# Patient Record
Sex: MALE | Race: WHITE | NOT HISPANIC OR LATINO | URBAN - METROPOLITAN AREA
[De-identification: names, ages, dates, MRNs, and addresses within clinical notes are randomized per-mention and may not be internally consistent; named-entity substitution may affect disease eponyms.]

---

## 2018-05-31 ENCOUNTER — EMERGENCY (EMERGENCY)
Facility: HOSPITAL | Age: 23
LOS: 1 days | Discharge: HOME | End: 2018-05-31
Attending: EMERGENCY MEDICINE
Payer: COMMERCIAL

## 2018-05-31 VITALS
TEMPERATURE: 98 F | SYSTOLIC BLOOD PRESSURE: 125 MMHG | HEART RATE: 90 BPM | DIASTOLIC BLOOD PRESSURE: 74 MMHG | RESPIRATION RATE: 16 BRPM | WEIGHT: 175.05 LBS | OXYGEN SATURATION: 98 %

## 2018-05-31 LAB
ALBUMIN SERPL ELPH-MCNC: 3.8 G/DL — SIGNIFICANT CHANGE UP (ref 3.3–5)
ALP SERPL-CCNC: 59 U/L — SIGNIFICANT CHANGE UP (ref 40–120)
ALT FLD-CCNC: 24 U/L — SIGNIFICANT CHANGE UP (ref 12–78)
ANION GAP SERPL CALC-SCNC: 6 MMOL/L — SIGNIFICANT CHANGE UP (ref 5–17)
AST SERPL-CCNC: 21 U/L — SIGNIFICANT CHANGE UP (ref 15–37)
BASOPHILS # BLD AUTO: 0.06 K/UL — SIGNIFICANT CHANGE UP (ref 0–0.2)
BASOPHILS NFR BLD AUTO: 0.5 % — SIGNIFICANT CHANGE UP (ref 0–2)
BILIRUB SERPL-MCNC: 0.4 MG/DL — SIGNIFICANT CHANGE UP (ref 0.2–1.2)
BUN SERPL-MCNC: 10 MG/DL — SIGNIFICANT CHANGE UP (ref 7–23)
CALCIUM SERPL-MCNC: 8.3 MG/DL — LOW (ref 8.5–10.1)
CHLORIDE SERPL-SCNC: 108 MMOL/L — SIGNIFICANT CHANGE UP (ref 96–108)
CO2 SERPL-SCNC: 28 MMOL/L — SIGNIFICANT CHANGE UP (ref 22–31)
CREAT SERPL-MCNC: 0.84 MG/DL — SIGNIFICANT CHANGE UP (ref 0.5–1.3)
EOSINOPHIL # BLD AUTO: 0.29 K/UL — SIGNIFICANT CHANGE UP (ref 0–0.5)
EOSINOPHIL NFR BLD AUTO: 2.3 % — SIGNIFICANT CHANGE UP (ref 0–6)
GLUCOSE SERPL-MCNC: 89 MG/DL — SIGNIFICANT CHANGE UP (ref 70–99)
HCT VFR BLD CALC: 39.7 % — SIGNIFICANT CHANGE UP (ref 39–50)
HGB BLD-MCNC: 13.1 G/DL — SIGNIFICANT CHANGE UP (ref 13–17)
IMM GRANULOCYTES NFR BLD AUTO: 0.3 % — SIGNIFICANT CHANGE UP (ref 0–1.5)
LYMPHOCYTES # BLD AUTO: 29.2 % — SIGNIFICANT CHANGE UP (ref 13–44)
LYMPHOCYTES # BLD AUTO: 3.64 K/UL — HIGH (ref 1–3.3)
MCHC RBC-ENTMCNC: 28.4 PG — SIGNIFICANT CHANGE UP (ref 27–34)
MCHC RBC-ENTMCNC: 33 GM/DL — SIGNIFICANT CHANGE UP (ref 32–36)
MCV RBC AUTO: 85.9 FL — SIGNIFICANT CHANGE UP (ref 80–100)
MONOCYTES # BLD AUTO: 1.1 K/UL — HIGH (ref 0–0.9)
MONOCYTES NFR BLD AUTO: 8.8 % — SIGNIFICANT CHANGE UP (ref 2–14)
NEUTROPHILS # BLD AUTO: 7.33 K/UL — SIGNIFICANT CHANGE UP (ref 1.8–7.4)
NEUTROPHILS NFR BLD AUTO: 58.9 % — SIGNIFICANT CHANGE UP (ref 43–77)
PLATELET # BLD AUTO: 298 K/UL — SIGNIFICANT CHANGE UP (ref 150–400)
POTASSIUM SERPL-MCNC: 3.7 MMOL/L — SIGNIFICANT CHANGE UP (ref 3.5–5.3)
POTASSIUM SERPL-SCNC: 3.7 MMOL/L — SIGNIFICANT CHANGE UP (ref 3.5–5.3)
PROT SERPL-MCNC: 8.1 G/DL — SIGNIFICANT CHANGE UP (ref 6–8.3)
RBC # BLD: 4.62 M/UL — SIGNIFICANT CHANGE UP (ref 4.2–5.8)
RBC # FLD: 13 % — SIGNIFICANT CHANGE UP (ref 10.3–14.5)
SODIUM SERPL-SCNC: 142 MMOL/L — SIGNIFICANT CHANGE UP (ref 135–145)
WBC # BLD: 12.46 K/UL — HIGH (ref 3.8–10.5)
WBC # FLD AUTO: 12.46 K/UL — HIGH (ref 3.8–10.5)

## 2018-05-31 PROCEDURE — 96374 THER/PROPH/DIAG INJ IV PUSH: CPT

## 2018-05-31 PROCEDURE — 85027 COMPLETE CBC AUTOMATED: CPT

## 2018-05-31 PROCEDURE — 36415 COLL VENOUS BLD VENIPUNCTURE: CPT

## 2018-05-31 PROCEDURE — 96375 TX/PRO/DX INJ NEW DRUG ADDON: CPT

## 2018-05-31 PROCEDURE — 90375 RABIES IG IM/SC: CPT

## 2018-05-31 PROCEDURE — 80053 COMPREHEN METABOLIC PANEL: CPT

## 2018-05-31 PROCEDURE — 90471 IMMUNIZATION ADMIN: CPT

## 2018-05-31 PROCEDURE — 99284 EMERGENCY DEPT VISIT MOD MDM: CPT | Mod: 25

## 2018-05-31 PROCEDURE — 90675 RABIES VACCINE IM: CPT

## 2018-05-31 PROCEDURE — 90472 IMMUNIZATION ADMIN EACH ADD: CPT

## 2018-05-31 PROCEDURE — 99284 EMERGENCY DEPT VISIT MOD MDM: CPT

## 2018-05-31 RX ORDER — KETOROLAC TROMETHAMINE 30 MG/ML
30 SYRINGE (ML) INJECTION ONCE
Qty: 0 | Refills: 0 | Status: DISCONTINUED | OUTPATIENT
Start: 2018-05-31 | End: 2018-05-31

## 2018-05-31 RX ORDER — SODIUM CHLORIDE 9 MG/ML
3 INJECTION INTRAMUSCULAR; INTRAVENOUS; SUBCUTANEOUS ONCE
Qty: 0 | Refills: 0 | Status: COMPLETED | OUTPATIENT
Start: 2018-05-31 | End: 2018-05-31

## 2018-05-31 RX ORDER — HUMAN RABIES VIRUS IMMUNE GLOBULIN 150 [IU]/ML
1588 INJECTION, SOLUTION INTRAMUSCULAR ONCE
Qty: 0 | Refills: 0 | Status: COMPLETED | OUTPATIENT
Start: 2018-05-31 | End: 2018-05-31

## 2018-05-31 RX ORDER — RABIES VACC, HUMAN DIPLOID/PF 2.5 UNIT
1 VIAL (EA) INTRAMUSCULAR ONCE
Qty: 0 | Refills: 0 | Status: COMPLETED | OUTPATIENT
Start: 2018-05-31 | End: 2018-05-31

## 2018-05-31 RX ORDER — BACITRACIN ZINC 500 UNIT/G
1 OINTMENT IN PACKET (EA) TOPICAL ONCE
Qty: 0 | Refills: 0 | Status: COMPLETED | OUTPATIENT
Start: 2018-05-31 | End: 2018-05-31

## 2018-05-31 RX ADMIN — Medication 1 MILLILITER(S): at 22:22

## 2018-05-31 RX ADMIN — Medication 30 MILLIGRAM(S): at 21:21

## 2018-05-31 RX ADMIN — SODIUM CHLORIDE 3 MILLILITER(S): 9 INJECTION INTRAMUSCULAR; INTRAVENOUS; SUBCUTANEOUS at 21:14

## 2018-05-31 RX ADMIN — HUMAN RABIES VIRUS IMMUNE GLOBULIN 1588 UNIT(S): 150 INJECTION, SOLUTION INTRAMUSCULAR at 21:22

## 2018-05-31 RX ADMIN — Medication 30 MILLIGRAM(S): at 21:20

## 2018-05-31 RX ADMIN — Medication 100 MILLIGRAM(S): at 21:21

## 2018-05-31 RX ADMIN — Medication 600 MILLIGRAM(S): at 22:28

## 2018-05-31 RX ADMIN — Medication 1 APPLICATION(S): at 21:21

## 2018-05-31 NOTE — ED PROVIDER NOTE - CARE PLAN
Principal Discharge DX:	Dog bite  Secondary Diagnosis:	Finger infection  Secondary Diagnosis:	Need for rabies vaccination

## 2018-05-31 NOTE — ED PROVIDER NOTE - SKIN, MLM
Skin normal color for race, warm, dry and intact. + MULTIPLE SMALL PUNCTURE WOUNDS CHIN <0.5CM. + LEFT HAND 2ND DIGIT DORSAL ASPECT WOUND APPEARS GLUED WITH SURROUNDING ERYTHEMA AND SWELLING.

## 2018-05-31 NOTE — ED PROVIDER NOTE - OBJECTIVE STATEMENT
pt is a 22yo male with no significant pmhx c/o dog bite and finger swelling x today. pt reports he was bit by a unknown dog on the street today, poodle provoked, not aggressive in the face. pt reports he does not know the dog or whose dog it is. pt reports he also has left hand 2nd digit swelling. pt reports he injured his finger yesterday fixing his car, [place] at [facility] [place], tetanus was updated and finger glued. pt reports today his finger started swelling.

## 2018-05-31 NOTE — ED PROVIDER NOTE - ATTENDING CONTRIBUTION TO CARE
pt with left finger swelling and redness after laceration yesterday.  Laceration was repaired with derma bond in the urgent care and no antibiotics were provided.  Today pt was bit by a skye stray dog on the chin.  labs, IV antibiotics, plastics consult.  pt to return tomorrow for a wound check.  Pt agree with plan and disposition.

## 2018-05-31 NOTE — ED PROVIDER NOTE - PROGRESS NOTE DETAILS
[facility], andrew balderas, advised pt needs rabies vaccine and immunoglobulin. discussed with pt who agrees.consulted hand for pt. will re-eval dr badillo advised pt can follow up in his office tomorrow. IV abx given here. attempted to open wound, pt could not tolerate. will rx clinda and advised pt to follow up with hand/ pmd/ ed for wound check tomorrow. rabies vaccine given and immunoglobulin. pt advised he needs 3 more vaccines on day 3,7,14. All questions answered and concerns addressed. pt verbalized understanding and agreement with plan and dx. pt advised to follow up with PMD. pt advised to return to ed for worsenng symptoms including fever, cp, sob. will dc.

## 2018-05-31 NOTE — ED PROVIDER NOTE - PHYSICAL EXAMINATION
MS:+ LEFT HAND 2ND DIGIT WITH SWELLING. UNABLE TO FLEX DIGIT. SENSATION GROSSLY INTACT   PV:+ 2 RADIAL. CAP REFILL < 2 SECONDS

## 2018-05-31 NOTE — ED ADULT TRIAGE NOTE - CHIEF COMPLAINT QUOTE
c/o redness and swelling of left index finger. pt got injured at work yesterday and Dermabond applied in the Urgent care. and, pt was bitten by stray dog X today.

## 2018-05-31 NOTE — ED ADULT NURSE REASSESSMENT NOTE - NS ED NURSE REASSESS COMMENT FT1
pt reavaluated and medicated as ordered and verbalizes good relief from pain and d/c home to follow up

## 2018-06-03 ENCOUNTER — EMERGENCY (EMERGENCY)
Facility: HOSPITAL | Age: 23
LOS: 1 days | Discharge: HOME | End: 2018-06-03
Attending: EMERGENCY MEDICINE
Payer: COMMERCIAL

## 2018-06-03 VITALS
DIASTOLIC BLOOD PRESSURE: 78 MMHG | TEMPERATURE: 98 F | WEIGHT: 175.05 LBS | RESPIRATION RATE: 16 BRPM | OXYGEN SATURATION: 98 % | HEIGHT: 71 IN | HEART RATE: 72 BPM | SYSTOLIC BLOOD PRESSURE: 123 MMHG

## 2018-06-03 PROCEDURE — 90675 RABIES VACCINE IM: CPT

## 2018-06-03 PROCEDURE — 90471 IMMUNIZATION ADMIN: CPT

## 2018-06-03 PROCEDURE — 99282 EMERGENCY DEPT VISIT SF MDM: CPT | Mod: 25

## 2018-06-03 PROCEDURE — 99281 EMR DPT VST MAYX REQ PHY/QHP: CPT

## 2018-06-03 RX ORDER — RABIES VACC, HUMAN DIPLOID/PF 2.5 UNIT
1 VIAL (EA) INTRAMUSCULAR ONCE
Qty: 0 | Refills: 0 | Status: COMPLETED | OUTPATIENT
Start: 2018-06-03 | End: 2018-06-03

## 2018-06-03 RX ADMIN — Medication 1 MILLILITER(S): at 13:30

## 2018-06-03 NOTE — ED PROVIDER NOTE - MEDICAL DECISION MAKING DETAILS
pt here for 2nd rabies series. Give rabies series. instructed patient to return on next scheduled date.

## 2018-06-03 NOTE — ED ADULT NURSE NOTE - OBJECTIVE STATEMENT
Pt presents to ED for 2nd rabies vaccine after being bit by an unknown dog on 5/31, received 1st dose same day. Pt reports he found a small dog on a busy road, tried to pick it up and was bitten below chin. Wound C/D/I with no S/S infection. Pt also reports left 2nd finger injury on 5/30, seen at Urgent Care, wound glued shut, swelling during last ED visit but improved with PO antibiotics. Wound C/D/I with no S/S infection

## 2018-06-03 NOTE — ED PROVIDER NOTE - OBJECTIVE STATEMENT
23M here for second rabies shot series. No complaints. of note patient had finger lac day prior to dog bite. dermabonded by urgicenter. wound is much better with abx prescribed prior.

## 2018-06-03 NOTE — ED PROVIDER NOTE - MUSCULOSKELETAL, MLM
Spine appears normal, range of motion is not limited, no muscle or joint tenderness left index finger laceration dermabonded. mild swelling. FROM. no redness or discharge. Patient states much better.

## 2018-06-03 NOTE — ED PROVIDER NOTE - ENMT, MLM
Airway patent, Nasal mucosa clear. Mouth with normal mucosa. Throat has no vesicles, no oropharyngeal exudates and uvula is midline. abrasion to under the chin from dog bite. no redness no discharge. no swelling. no bleeding.

## 2018-06-07 ENCOUNTER — EMERGENCY (EMERGENCY)
Facility: HOSPITAL | Age: 23
LOS: 1 days | Discharge: HOME | End: 2018-06-07
Attending: EMERGENCY MEDICINE
Payer: COMMERCIAL

## 2018-06-07 VITALS
OXYGEN SATURATION: 98 % | SYSTOLIC BLOOD PRESSURE: 136 MMHG | DIASTOLIC BLOOD PRESSURE: 80 MMHG | TEMPERATURE: 98 F | WEIGHT: 175.05 LBS | RESPIRATION RATE: 14 BRPM | HEART RATE: 74 BPM | HEIGHT: 71 IN

## 2018-06-07 PROCEDURE — 99282 EMERGENCY DEPT VISIT SF MDM: CPT | Mod: 25

## 2018-06-07 PROCEDURE — 90675 RABIES VACCINE IM: CPT

## 2018-06-07 PROCEDURE — 90471 IMMUNIZATION ADMIN: CPT

## 2018-06-07 PROCEDURE — 99281 EMR DPT VST MAYX REQ PHY/QHP: CPT

## 2018-06-07 RX ORDER — RABIES VACC, HUMAN DIPLOID/PF 2.5 UNIT
1 VIAL (EA) INTRAMUSCULAR ONCE
Qty: 0 | Refills: 0 | Status: COMPLETED | OUTPATIENT
Start: 2018-06-07 | End: 2018-06-07

## 2018-06-07 RX ADMIN — Medication 1 MILLILITER(S): at 22:52

## 2018-06-07 NOTE — ED PROVIDER NOTE - PHYSICAL EXAMINATION
GEN: awake, alert, well appearing, NAD   HENT: atraumatic, normocephalic, KIRBY, EOMI, no midline instability, oropharynx w/o erythema or exudates, no lymphadenopathy  CV: normal rate and rhythm, S1, S2, no MRG, equal pulses throughout, no JVD  RESP: no distress, no IWOB, no retraction, clear to auscultation bilaterally   ABD: soft, nontender, nondistended, no rebound, no guarding, normoactive bowel sounds, no organomegally  MUSCULOSKELETAL: strenght 5/5 x 4, full range of motion, CMS intact   SKIN: normal color, no turgor, no wounds or rash   NEURO: Awake alert oriented x 3, no facial asymmetry, no slurred speech, no pronator drift, moving all extremities  PSYCH: no suicial ideation, no homicidal ideation, no depression, no anxiety, no hallucination

## 2018-06-07 NOTE — ED PROVIDER NOTE - NS ED ROS FT
GEN: no fever, no chills, no weakness  HENT: no eye pain, no visual changes, no ear pain, no visual or hearing changes, no sore throat, no swelling or neck pain  CV: no chest pain, no palpitations, no dizziness, no swelling  RESP: no coughing, no sob, no IWOB, no ROJAS  GI: no abd pain, no distension, no nausea, no vomiting, no diarrhea, no constipation  : no dysuria,  no frequency, no hematuria, no discharge, no flank pain  MUSCULOSKELETAL: no myalgia, no arthralgia, no joint swelling, no bruising   SKIN: no rash, no wounds, no itching  NEURO: no change in mentation, no visual changes, no HA, no focal weakness, no trouble speaking, no gait abnormalities, no dizziness  PSYCH: no suidical ideation, no homicidal ideation, no depression, no anxiety, no hallucinations

## 2018-06-07 NOTE — ED PROVIDER NOTE - MEDICAL DECISION MAKING DETAILS
22yo m here for 3rd post dog bite sustained on May 31st. no f/c/n/v/d.  rabies vaccine administered, next scheduled in 7 days, pt aware of schedule.

## 2018-06-15 ENCOUNTER — EMERGENCY (EMERGENCY)
Facility: HOSPITAL | Age: 23
LOS: 1 days | Discharge: HOME | End: 2018-06-15
Attending: EMERGENCY MEDICINE
Payer: COMMERCIAL

## 2018-06-15 VITALS
HEART RATE: 75 BPM | HEIGHT: 71 IN | OXYGEN SATURATION: 97 % | DIASTOLIC BLOOD PRESSURE: 66 MMHG | RESPIRATION RATE: 16 BRPM | SYSTOLIC BLOOD PRESSURE: 125 MMHG | WEIGHT: 175.05 LBS | TEMPERATURE: 98 F

## 2018-06-15 PROCEDURE — 99283 EMERGENCY DEPT VISIT LOW MDM: CPT | Mod: 25

## 2018-06-15 PROCEDURE — 90675 RABIES VACCINE IM: CPT

## 2018-06-15 PROCEDURE — 99281 EMR DPT VST MAYX REQ PHY/QHP: CPT

## 2018-06-15 PROCEDURE — 90471 IMMUNIZATION ADMIN: CPT

## 2018-06-15 RX ORDER — RABIES VACC, HUMAN DIPLOID/PF 2.5 UNIT
1 VIAL (EA) INTRAMUSCULAR ONCE
Qty: 0 | Refills: 0 | Status: COMPLETED | OUTPATIENT
Start: 2018-06-15 | End: 2018-06-15

## 2018-06-15 RX ADMIN — Medication 1 MILLILITER(S): at 15:36

## 2018-06-15 NOTE — ED PROVIDER NOTE - OBJECTIVE STATEMENT
23 male here for 4th rabies vaccination, s/p dog bite to face 5/31/18. Denies fever, feels well, offers no complaints.

## 2018-10-24 ENCOUNTER — EMERGENCY (EMERGENCY)
Facility: HOSPITAL | Age: 23
LOS: 1 days | Discharge: HOME | End: 2018-10-24
Attending: STUDENT IN AN ORGANIZED HEALTH CARE EDUCATION/TRAINING PROGRAM
Payer: COMMERCIAL

## 2018-10-24 VITALS
RESPIRATION RATE: 18 BRPM | OXYGEN SATURATION: 98 % | HEART RATE: 71 BPM | TEMPERATURE: 98 F | DIASTOLIC BLOOD PRESSURE: 75 MMHG | SYSTOLIC BLOOD PRESSURE: 135 MMHG

## 2018-10-24 VITALS
TEMPERATURE: 97 F | HEIGHT: 71 IN | WEIGHT: 175.05 LBS | HEART RATE: 62 BPM | RESPIRATION RATE: 18 BRPM | OXYGEN SATURATION: 99 % | SYSTOLIC BLOOD PRESSURE: 124 MMHG | DIASTOLIC BLOOD PRESSURE: 79 MMHG

## 2018-10-24 PROCEDURE — 93005 ELECTROCARDIOGRAM TRACING: CPT

## 2018-10-24 PROCEDURE — 70450 CT HEAD/BRAIN W/O DYE: CPT | Mod: 26

## 2018-10-24 PROCEDURE — 99284 EMERGENCY DEPT VISIT MOD MDM: CPT

## 2018-10-24 PROCEDURE — 70450 CT HEAD/BRAIN W/O DYE: CPT

## 2018-10-24 PROCEDURE — 99284 EMERGENCY DEPT VISIT MOD MDM: CPT | Mod: 25

## 2018-10-24 RX ORDER — ONDANSETRON 8 MG/1
4 TABLET, FILM COATED ORAL ONCE
Qty: 0 | Refills: 0 | Status: COMPLETED | OUTPATIENT
Start: 2018-10-24 | End: 2018-10-24

## 2018-10-24 RX ORDER — ACETAMINOPHEN 500 MG
650 TABLET ORAL ONCE
Qty: 0 | Refills: 0 | Status: COMPLETED | OUTPATIENT
Start: 2018-10-24 | End: 2018-10-24

## 2018-10-24 RX ADMIN — ONDANSETRON 4 MILLIGRAM(S): 8 TABLET, FILM COATED ORAL at 03:22

## 2018-10-24 RX ADMIN — Medication 650 MILLIGRAM(S): at 01:03

## 2018-10-24 NOTE — ED ADULT NURSE NOTE - OBJECTIVE STATEMENT
Pt received alert and orienteed x 4 c/o head injury. Pt stated he hit is head to wall, lump noted on forehead. Pt reports 6/10 headache, intermittent. Pt c/o nausea, vomiting x 1 and dizziness. Pt denies syncope. Pt reports taking advil, partial relief.

## 2018-10-24 NOTE — ED ADULT TRIAGE NOTE - CHIEF COMPLAINT QUOTE
Patient stated he hit himself by the wall while he was playing with his dog noted with a lump to forehead complaining of headache 6/10 at 6:30 pm; stated he passed out and feeling nauseous at this time.

## 2018-10-24 NOTE — ED PROVIDER NOTE - MEDICAL DECISION MAKING DETAILS
23 year old male with head injury, now with nausea, dizziness, weakness and headache.  CT head, tylenol, reassess

## 2018-10-24 NOTE — ED PROVIDER NOTE - NEUROLOGICAL SENSATION
Helical Rim Advancement Flap Text: The defect edges were debeveled with a #15 blade scalpel.  Given the location of the defect and the proximity to free margins (helical rim) a double helical rim advancement flap was deemed most appropriate.  Using a sterile surgical marker, the appropriate advancement flaps were drawn incorporating the defect and placing the expected incisions between the helical rim and antihelix where possible.  The area thus outlined was incised through and through with a #15 scalpel blade.  With a skin hook and iris scissors, the flaps were gently and sharply undermined and freed up. present and normal in 4 extremities

## 2018-10-24 NOTE — ED PROVIDER NOTE - OBJECTIVE STATEMENT
23 year old male with no significant PMH presents with head injury.  He was playing with his dog at 6:30pm and walked into a wall, hitting his forehead.  He states he felt dazed initially and stumbled backwards.  No LOC.  He took 2 Advil and then took a nap.  He woke up at 8pm with persistent headache and noted a bump to his right forehead.  He coniu 23 year old male with no significant PMH presents with head injury.  He was playing with his dog at 6:30pm and walked into a wall, hitting his forehead.  He states he felt dazed initially and stumbled backwards.  No LOC.  He took 2 Advil and then took a nap.  He woke up at 8pm with persistent headache and noted a bump to his right forehead.  He felt dizzy and nauseous.  He told his father and contrary to triage note, patient did not have a syncopal episode.  Father witnessed patient become weak and patient sat down on the floor.  No LOC; he was responsive at all times.  He vomited once.  PMD [facility] in [place].

## 2018-10-24 NOTE — ED PROVIDER NOTE - PROGRESS NOTE DETAILS
Patient feels well. Head injury instructions given, advised no sports and no work for 1 week where head injury is a possibility. Patient expressed understanding.  Copy of CT head given, stable for d/c with Dad at bedside

## 2019-04-25 PROBLEM — Z00.00 ENCOUNTER FOR PREVENTIVE HEALTH EXAMINATION: Status: ACTIVE | Noted: 2019-04-25

## 2019-04-26 ENCOUNTER — APPOINTMENT (OUTPATIENT)
Dept: ORTHOPEDIC SURGERY | Facility: CLINIC | Age: 24
End: 2019-04-26
Payer: COMMERCIAL

## 2019-04-26 VITALS
DIASTOLIC BLOOD PRESSURE: 81 MMHG | BODY MASS INDEX: 23.8 KG/M2 | SYSTOLIC BLOOD PRESSURE: 131 MMHG | HEIGHT: 71 IN | HEART RATE: 59 BPM | WEIGHT: 170 LBS

## 2019-04-26 DIAGNOSIS — Z87.09 PERSONAL HISTORY OF OTHER DISEASES OF THE RESPIRATORY SYSTEM: ICD-10-CM

## 2019-04-26 DIAGNOSIS — Z87.891 PERSONAL HISTORY OF NICOTINE DEPENDENCE: ICD-10-CM

## 2019-04-26 DIAGNOSIS — M54.2 CERVICALGIA: ICD-10-CM

## 2019-04-26 DIAGNOSIS — Z78.9 OTHER SPECIFIED HEALTH STATUS: ICD-10-CM

## 2019-04-26 DIAGNOSIS — M54.5 LOW BACK PAIN: ICD-10-CM

## 2019-04-26 DIAGNOSIS — M54.9 DORSALGIA, UNSPECIFIED: ICD-10-CM

## 2019-04-26 PROCEDURE — 72100 X-RAY EXAM L-S SPINE 2/3 VWS: CPT

## 2019-04-26 PROCEDURE — 72040 X-RAY EXAM NECK SPINE 2-3 VW: CPT

## 2019-04-26 PROCEDURE — 99204 OFFICE O/P NEW MOD 45 MIN: CPT

## 2019-04-26 NOTE — DISCUSSION/SUMMARY
[Medication Risks Reviewed] : Medication risks reviewed [de-identified] : I recommended moist he and I have increased the ibuprofen 800 mg 3 times a day. He will call if there are problems with the medication or worsening of his symptoms and I will see him for followup in 3 weeks.

## 2019-04-26 NOTE — HISTORY OF PRESENT ILLNESS
[de-identified] : This 24-year-old  has a few weeks of neck and upper back pain as well as upper lumbar back pain. He has had some stiff neck some the past. He has had some occasional tingling in the arms and some tingling and burning in the legs. There is no pain in the arms or legs. The neck and upper back pain is graded as a 6 on a lower back pain as an 8. The pain is_femoral portion to move his bowels but can be worse sneezing. He has some night pain turning in bed in the lower back. The lower back symptoms are worse with sitting and driving but no or standing or walking. He was seen in urgent care where Flexeril as prescribed. He has been taking ibuprofen but only 600 mg twice a day. [Pain Location] : pain [8] : a current pain level of 8/10

## 2019-04-26 NOTE — PHYSICAL EXAM
[de-identified] : He is fully alert and oriented with a normal mood and affect. He is in no acute distress. He ambulates with a normal gait including tiptoe and heel walking. There are no cutaneous abnormalities or palpable bony defects of the spine. There is no evidence of shortness of breath or respiratory distress. There is no paravertebral muscle spasm or trochanteric tenderness. There is some very mild bilateral sciatic notch sensitivity. Forward flexion of the spine she is limited with fingertips reaching to within 14 inches of the floor causing some mild lower back pain. His lower extremity neurological examination revealed 1+ symmetrical reflexes with normal motor power and sensation. Straight leg raising is negative to 90°. Vascular exam shows no evidence of varicosities there is no lymphedema. His knees have a full range of motion with normal stability. There are no cutaneous abnormalities of the upper lower extremities. Shoulder range of motion is full but with some discomfort at the extremes. Range of motion of the cervical spine shows flexion with the chin region to within 2-1/2 fingerbreadths of the chest. Extension is limited to 50° with rotation of 60° bilaterally and tilt of 20° bilaterally with discomfort at the extremes. His upper extremity neurological examination revealed a 1+ symmetrical reflexes with reinforcement. Motor and sensory exam is normal. [de-identified] : AP and lateral x-rays of the cervical spine reveal normal sagittal alignment. Disc heights are well maintained. There are no destructive changes.\par \par AP and lateral x-rays of the lumbar spine revealed a mild left lumbar scoliosis. Sagittal alignment is normal. There are no destructive changes. There may be some mild displaced now at the L4-5 level.

## 2019-05-17 ENCOUNTER — APPOINTMENT (OUTPATIENT)
Dept: ORTHOPEDIC SURGERY | Facility: CLINIC | Age: 24
End: 2019-05-17

## 2020-08-04 NOTE — ED PROVIDER NOTE - NS ED ATTENDING STATEMENT MOD
Attending Only
Detail Level: Simple
Instructions: This plan will send the code FBSE to the PM system.  DO NOT or CHANGE the price.
Price (Do Not Change): 0.00

## 2021-04-11 NOTE — ED ADULT NURSE NOTE - NSIMPLEMENTINTERV_GEN_ALL_ED
Implemented All Universal Safety Interventions:  [place] to call system. Call bell, personal items and telephone within reach. Instruct patient to call for assistance. Room bathroom lighting operational. Non-slip footwear when patient is off stretcher. Physically safe environment: no spills, clutter or unnecessary equipment. Stretcher in lowest position, wheels locked, appropriate side rails in place.
labs

## 2021-06-14 ENCOUNTER — INPATIENT (INPATIENT)
Facility: HOSPITAL | Age: 26
LOS: 0 days | Discharge: HOME | DRG: 603 | End: 2021-06-15
Attending: HOSPITALIST | Admitting: STUDENT IN AN ORGANIZED HEALTH CARE EDUCATION/TRAINING PROGRAM
Payer: MEDICAID

## 2021-06-14 VITALS
RESPIRATION RATE: 16 BRPM | TEMPERATURE: 98 F | DIASTOLIC BLOOD PRESSURE: 75 MMHG | HEIGHT: 71 IN | OXYGEN SATURATION: 98 % | WEIGHT: 176.37 LBS | HEART RATE: 83 BPM | SYSTOLIC BLOOD PRESSURE: 136 MMHG

## 2021-06-14 LAB
ALBUMIN SERPL ELPH-MCNC: 3.7 G/DL — SIGNIFICANT CHANGE UP (ref 3.3–5)
ALP SERPL-CCNC: 49 U/L — SIGNIFICANT CHANGE UP (ref 40–120)
ALT FLD-CCNC: 22 U/L — SIGNIFICANT CHANGE UP (ref 12–78)
ANION GAP SERPL CALC-SCNC: 7 MMOL/L — SIGNIFICANT CHANGE UP (ref 5–17)
AST SERPL-CCNC: 23 U/L — SIGNIFICANT CHANGE UP (ref 15–37)
BASOPHILS # BLD AUTO: 0.06 K/UL — SIGNIFICANT CHANGE UP (ref 0–0.2)
BASOPHILS NFR BLD AUTO: 0.5 % — SIGNIFICANT CHANGE UP (ref 0–2)
BILIRUB SERPL-MCNC: 0.5 MG/DL — SIGNIFICANT CHANGE UP (ref 0.2–1.2)
BUN SERPL-MCNC: 11 MG/DL — SIGNIFICANT CHANGE UP (ref 7–23)
CALCIUM SERPL-MCNC: 8.7 MG/DL — SIGNIFICANT CHANGE UP (ref 8.5–10.1)
CHLORIDE SERPL-SCNC: 107 MMOL/L — SIGNIFICANT CHANGE UP (ref 96–108)
CO2 SERPL-SCNC: 26 MMOL/L — SIGNIFICANT CHANGE UP (ref 22–31)
CREAT SERPL-MCNC: 0.8 MG/DL — SIGNIFICANT CHANGE UP (ref 0.5–1.3)
EOSINOPHIL # BLD AUTO: 0.23 K/UL — SIGNIFICANT CHANGE UP (ref 0–0.5)
EOSINOPHIL NFR BLD AUTO: 1.7 % — SIGNIFICANT CHANGE UP (ref 0–6)
GLUCOSE SERPL-MCNC: 78 MG/DL — SIGNIFICANT CHANGE UP (ref 70–99)
HCT VFR BLD CALC: 42 % — SIGNIFICANT CHANGE UP (ref 39–50)
HGB BLD-MCNC: 13.7 G/DL — SIGNIFICANT CHANGE UP (ref 13–17)
IMM GRANULOCYTES NFR BLD AUTO: 0.2 % — SIGNIFICANT CHANGE UP (ref 0–1.5)
LACTATE SERPL-SCNC: 0.7 MMOL/L — SIGNIFICANT CHANGE UP (ref 0.7–2)
LYMPHOCYTES # BLD AUTO: 26.3 % — SIGNIFICANT CHANGE UP (ref 13–44)
LYMPHOCYTES # BLD AUTO: 3.49 K/UL — HIGH (ref 1–3.3)
MCHC RBC-ENTMCNC: 28 PG — SIGNIFICANT CHANGE UP (ref 27–34)
MCHC RBC-ENTMCNC: 32.6 GM/DL — SIGNIFICANT CHANGE UP (ref 32–36)
MCV RBC AUTO: 85.9 FL — SIGNIFICANT CHANGE UP (ref 80–100)
MONOCYTES # BLD AUTO: 1.05 K/UL — HIGH (ref 0–0.9)
MONOCYTES NFR BLD AUTO: 7.9 % — SIGNIFICANT CHANGE UP (ref 2–14)
NEUTROPHILS # BLD AUTO: 8.42 K/UL — HIGH (ref 1.8–7.4)
NEUTROPHILS NFR BLD AUTO: 63.4 % — SIGNIFICANT CHANGE UP (ref 43–77)
NRBC # BLD: 0 /100 WBCS — SIGNIFICANT CHANGE UP (ref 0–0)
PLATELET # BLD AUTO: 287 K/UL — SIGNIFICANT CHANGE UP (ref 150–400)
POTASSIUM SERPL-MCNC: 3.7 MMOL/L — SIGNIFICANT CHANGE UP (ref 3.5–5.3)
POTASSIUM SERPL-SCNC: 3.7 MMOL/L — SIGNIFICANT CHANGE UP (ref 3.5–5.3)
PROT SERPL-MCNC: 8.3 G/DL — SIGNIFICANT CHANGE UP (ref 6–8.3)
RBC # BLD: 4.89 M/UL — SIGNIFICANT CHANGE UP (ref 4.2–5.8)
RBC # FLD: 12.7 % — SIGNIFICANT CHANGE UP (ref 10.3–14.5)
SODIUM SERPL-SCNC: 140 MMOL/L — SIGNIFICANT CHANGE UP (ref 135–145)
WBC # BLD: 13.28 K/UL — HIGH (ref 3.8–10.5)
WBC # FLD AUTO: 13.28 K/UL — HIGH (ref 3.8–10.5)

## 2021-06-14 PROCEDURE — 73630 X-RAY EXAM OF FOOT: CPT | Mod: 26,RT

## 2021-06-14 PROCEDURE — 99285 EMERGENCY DEPT VISIT HI MDM: CPT

## 2021-06-14 RX ORDER — OXYCODONE AND ACETAMINOPHEN 5; 325 MG/1; MG/1
1 TABLET ORAL ONCE
Refills: 0 | Status: DISCONTINUED | OUTPATIENT
Start: 2021-06-14 | End: 2021-06-14

## 2021-06-14 RX ORDER — SODIUM CHLORIDE 9 MG/ML
1000 INJECTION INTRAMUSCULAR; INTRAVENOUS; SUBCUTANEOUS ONCE
Refills: 0 | Status: COMPLETED | OUTPATIENT
Start: 2021-06-14 | End: 2021-06-14

## 2021-06-14 RX ADMIN — SODIUM CHLORIDE 1000 MILLILITER(S): 9 INJECTION INTRAMUSCULAR; INTRAVENOUS; SUBCUTANEOUS at 22:55

## 2021-06-14 RX ADMIN — OXYCODONE AND ACETAMINOPHEN 1 TABLET(S): 5; 325 TABLET ORAL at 23:05

## 2021-06-14 RX ADMIN — SODIUM CHLORIDE 2000 MILLILITER(S): 9 INJECTION INTRAMUSCULAR; INTRAVENOUS; SUBCUTANEOUS at 21:55

## 2021-06-14 RX ADMIN — OXYCODONE AND ACETAMINOPHEN 1 TABLET(S): 5; 325 TABLET ORAL at 21:57

## 2021-06-14 RX ADMIN — Medication 900 MILLIGRAM(S): at 23:35

## 2021-06-14 RX ADMIN — Medication 100 MILLIGRAM(S): at 23:05

## 2021-06-14 NOTE — ED ADULT NURSE NOTE - NSIMPLEMENTINTERV_GEN_ALL_ED
Implemented All Universal Safety Interventions:  [place] to call system. Call bell, personal items and telephone within reach. Instruct patient to call for assistance. Room bathroom lighting operational. Non-slip footwear when patient is off stretcher. Physically safe environment: no spills, clutter or unnecessary equipment. Stretcher in lowest position, wheels locked, appropriate side rails in place.

## 2021-06-14 NOTE — ED PROVIDER NOTE - CLINICAL SUMMARY MEDICAL DECISION MAKING FREE TEXT BOX
25 yo male with no significant pmh presents to the ED c/o right foot pain/injury s/p water pressure injury from  on Friday. + laceration to right foot. UTD with tetanus. + swelling and redness to right foot. Patient with difficulty ambulating due to pain. + paresthesias at site of injury. no fever. Denies fever, chills, chest pain, sob, abd pain, N/V, LE weakness.  PE: as above. a/p: labs, xr, pain meds

## 2021-06-14 NOTE — H&P ADULT - NSHPPHYSICALEXAM_GEN_ALL_CORE
Vital Signs Last 24 Hrs  T(C): 36.7 (14 Jun 2021 23:18), Max: 36.7 (14 Jun 2021 21:00)  T(F): 98 (14 Jun 2021 23:18), Max: 98.1 (14 Jun 2021 21:00)  HR: 80 (14 Jun 2021 23:18) (76 - 83)  BP: 122/72 (14 Jun 2021 23:18) (122/72 - 136/75)  BP(mean): --  RR: 16 (14 Jun 2021 23:18) (15 - 16)  SpO2: 96% (14 Jun 2021 23:18) (96% - 98%)    General: Well developed, well nourished, NAD  HEENT: NCAT, PERRLA, EOMI bl, moist mucous membranes   Neck: Supple, nontender, no mass  Neurology: A&Ox3, nonfocal  Respiratory: CTA B/L, No W/R/R  CV: RRR, +S1/S2, no murmurs, rubs or gallops  Abdominal: Soft, NT, ND +BSx4  Extremities: No C/C/E, + peripheral pulses  MSK: Normal ROM, no joint erythema or warmth, no joint swelling   Skin: warm, dry, normal color Vital Signs Last 24 Hrs  T(C): 36.7 (14 Jun 2021 23:18), Max: 36.7 (14 Jun 2021 21:00)  T(F): 98 (14 Jun 2021 23:18), Max: 98.1 (14 Jun 2021 21:00)  HR: 80 (14 Jun 2021 23:18) (76 - 83)  BP: 122/72 (14 Jun 2021 23:18) (122/72 - 136/75)  BP(mean): --  RR: 16 (14 Jun 2021 23:18) (15 - 16)  SpO2: 96% (14 Jun 2021 23:18) (96% - 98%)    General: Well developed, well nourished, NAD  HEENT: NCAT, PERRL, EOMI bl, moist mucous membranes   Neck: Supple  Neurology: A&Ox3, able to move all 4 extremities  Respiratory: CTA B/L, No W/R/R  CV: RRR, +S1/S2, no murmurs, rubs or gallops  Abdominal: Soft, NT, ND +BSx4  Extremities: R foot erythema, edema, warmth, and tenderness to palpation along dorsal aspect; +2 DT pulses b/l, faint DP pulse appreciated  MSK: Full ROM of feet b/l  Skin: warm, dry, normal color Vital Signs Last 24 Hrs  T(C): 36.7 (14 Jun 2021 23:18), Max: 36.7 (14 Jun 2021 21:00)  T(F): 98 (14 Jun 2021 23:18), Max: 98.1 (14 Jun 2021 21:00)  HR: 80 (14 Jun 2021 23:18) (76 - 83)  BP: 122/72 (14 Jun 2021 23:18) (122/72 - 136/75)  BP(mean): --  RR: 16 (14 Jun 2021 23:18) (15 - 16)  SpO2: 96% (14 Jun 2021 23:18) (96% - 98%)    General: Well developed, well nourished, NAD  HEENT: NCAT, PERRL, EOMI bl, moist mucous membranes   Neck: Supple  Neurology: A&Ox3, able to move all 4 extremities  Respiratory: CTA B/L, No W/R/R  CV: RRR, +S1/S2, no murmurs, rubs or gallops  Abdominal: Soft, NT, ND +BSx4  Extremities: R foot erythema, edema, warmth, and tenderness to palpation along dorsal aspect; ~4cm eschar noted along site of injury in dorsal aspect of foot along MCP joint between 3rd and 4th digit; +2 DT pulses b/l, faint DP pulse appreciated  MSK: Full ROM of feet b/l  Skin: warm, dry, normal color Vital Signs Last 24 Hrs  T(C): 36.7 (14 Jun 2021 23:18), Max: 36.7 (14 Jun 2021 21:00)  T(F): 98 (14 Jun 2021 23:18), Max: 98.1 (14 Jun 2021 21:00)  HR: 80 (14 Jun 2021 23:18) (76 - 83)  BP: 122/72 (14 Jun 2021 23:18) (122/72 - 136/75)  BP(mean): --  RR: 16 (14 Jun 2021 23:18) (15 - 16)  SpO2: 96% (14 Jun 2021 23:18) (96% - 98%)    General: Well developed, well nourished, NAD  HEENT: NCAT, PERRL, EOMI bl, moist mucous membranes   Neck: Supple  Neurology: A&Ox3, able to move all 4 extremities  Respiratory: CTA B/L, No W/R/R  CV: RRR, +S1/S2, no murmurs, rubs or gallops  Abdominal: Soft, NT, ND +BSx4  Extremities: R foot erythema, edema, warmth, and tenderness to palpation along dorsal aspect; ~4cm wound noted along site of injury in dorsal aspect of foot along MCP joint between 3rd and 4th digit; +2 DT pulses b/l, faint DP pulse appreciated. good cap refill. sensation intact distal to injury. compartment soft  MSK: Full ROM of feet b/l  Skin: warm, dry, normal color

## 2021-06-14 NOTE — ED PROVIDER NOTE - ATTENDING CONTRIBUTION TO CARE
25 yo male no pmhx s/p  injury to right foot (was cleaning swimming pool) 3 days ago, utd with tetanus, c/o swelling and redness to right foot.  Has been applying iodine to wound, no medications taken. No fever/chills.  Admits to having difficulty bearing weight on that food.  Also noticed redness has increased over the past 3 days.    Gen: Alert, NAD  Head/eyes: NC/AT  ENT: airway patent  Neck: supple, no tenderness/meningismus/JVD, Trachea midline  Pulm/lung: Bilateral clear BS, normal resp effort, no wheeze/stridor/retractions  CV/heart: RRR, no M/R/G, +2 dist pulses (radial, pedal DP/PT, popliteal)  GI/Abd: soft, NT/ND, +BS, no guarding/rebound tenderness  Musculoskeletal: no edema/erythema/cyanosis, FROM in all extremities, no C/T/L spine ttp  Skin: + ulcerated wound to right foot over 4th/5th distal metatarsal, + surrounding swelling and erythema extending to 2/3 of dorsal side of foot. minimal warmth, no crepitus. cap refill <2 sec  Neuro: AAOx3, CN 2-12 intact, normal sensation, 5/5 motor strength in all extremities    wbc 13k, IV abx, admit for observation with possible wound care/podiatry consult

## 2021-06-14 NOTE — H&P ADULT - PROBLEM SELECTOR PLAN 3
DVT PPX: Encourage ambulation    IMPROVE VTE Individual Risk Assessment          RISK                                                          Points  [  ] Previous VTE                                                3  [  ] Thrombophilia                                             2  [  ] Lower limb paralysis                                   2        (unable to hold up >15 seconds)    [  ] Current Cancer                                             2         (within 6 months)  [  ] Immobilization > 24 hrs                              1  [  ] ICU/CCU stay > 24 hours                             1  [  ] Age > 60                                                         1    IMPROVE VTE Score: 0

## 2021-06-14 NOTE — H&P ADULT - ASSESSMENT
25 yo m no significant pmh presents to the ed s/p injury to foot by     # 27 yo m no significant pmh presents to the ed s/p injury to foot by  25 y/o M w/ no significant pmhx presents to the ED s/p injury to R foot by . Admitted for Cellulitis, r/o compartment syndrome. 27 y/o M w/ no significant pmhx presents to the ED s/p injury to R foot by . Admitted for Cellulitis

## 2021-06-14 NOTE — ED PROVIDER NOTE - OBJECTIVE STATEMENT
27 yo male with no significant pmh presents to the ED c/o right foot pain/injury s/p water pressure injury from  on Friday. + laceration to right foot. UTD with tetanus. + swelling and redness to right foot. Patient with difficulty ambulating due to pain. + paresthesias at site of injury. no fever. Denies fever, chills, chest pain, sob, abd pain, N/V, LE weakness.

## 2021-06-14 NOTE — H&P ADULT - PROBLEM SELECTOR PLAN 1
Patient w/ initial trauma 3 days ago, w/ erythema and edema noted this morning, pulses intact b/l  - Admit for cellulitis 2/2 trauma, r/o Compartment syndrome  - Leukocytosis, but afebrile, no lactate--Does not meet SIRS criteria  - R foot x-ray ordered to r/o compartment syndrome, f/u results  - s/p IV Clindamycin 900mg x 1 on admission, can continue PO Clinda 600mg q8h x 7 days  - s/p 1L NS bolus x 1  - Regular diet  - Pain control  - Trend wbc  - Podiatry and Wound care RN consulted Patient w/ initial trauma 3 days ago, w/ erythema and edema noted this morning, pulses intact b/l  - Admit for cellulitis 2/2 trauma, r/o Compartment syndrome  - Leukocytosis, but afebrile, no lactate--Does not meet SIRS criteria  - R foot x-ray ordered to r/o compartment syndrome, f/u results  - s/p IV Clindamycin 900mg x 1 on admission, can continue PO Clinda 600mg q8h x 7 days  - s/p 1L NS bolus x 1  - Regular diet  - Pain control  - Trend wbc  - Wound care RN consulted, Will need Podiatry consult Patient w/ initial trauma 3 days ago, w/ erythema and edema noted this morning, pulses intact b/l  - Admit for cellulitis 2/2 trauma, r/o Compartment syndrome  - Leukocytosis, but afebrile, no lactate--Does not meet SIRS criteria  - R foot x-ray ordered to r/o compartment syndrome, f/u results  - s/p IV Clindamycin 900mg x 1 on admission, can continue PO Clinda 600mg q8h x 7 days in setting of puncture wound  - s/p 1L NS bolus x 1  - Regular diet  - Pain control  - Trend wbc  - Wound care RN consulted, Will need Podiatry consult Patient w/ initial trauma 3 days ago, w/ erythema and edema noted this morning, pulses intact b/l  - Admit for cellulitis 2/2 trauma, r/o Compartment syndrome  - Leukocytosis, but afebrile, no lactate--Does not meet SIRS criteria  - R foot x-ray ordered to r/o compartment syndrome, f/u results  - s/p IV Clindamycin 900mg x 1 on admission, can continue PO Clinda 450mg q8h x 7 days in setting of puncture wound  - s/p 1L NS bolus x 1  - Regular diet  - Pain control  - Trend wbc  - Wound care RN consulted, Will need Podiatry consult Patient w/ initial trauma 3 days ago, w/ erythema and edema noted this morning, pulses intact b/l  - Admit for cellulitis 2/2 trauma  -Compartment soft, pulses intact, good cap refill. sensation and strength intact. Given mechanism of injury will continue to monitor.  - Leukocytosis, but afebrile, no lactate--Does not meet SIRS criteria  - R foot x-ray ordered. awaiting results.   - s/p IV Clindamycin 900mg x 1 on admission, can continue PO Clinda 450mg q8h x 7 days in setting of traumatic wound  - start probiotic  - s/p 1L NS bolus x 1  - Regular diet  - Pain control  - Trend wbc  - Wound care RN consulted, Will need Podiatry consult Patient w/ initial trauma 3 days ago, w/ erythema and edema noted this morning, pulses intact b/l  - Admit for cellulitis 2/2 trauma  -Compartment soft, pulses intact, good cap refill. sensation and strength intact. Given mechanism of injury will continue to monitor.  - Leukocytosis, but afebrile, no lactate--Does not meet SIRS criteria  - R foot x-ray ordered. awaiting results.   - s/p IV Clindamycin 900mg x 1 on admission, can continue clindamycin 600mg q8  in setting of traumatic wound  - start probiotic  - s/p 1L NS bolus x 1  - Regular diet  - Pain control  - Trend wbc  - Wound care RN consulted, Will need Podiatry consult

## 2021-06-14 NOTE — ED PROVIDER NOTE - CROS ED SKIN ALL NEG
LMP- 10/19/19    Patient has seen NP Lorraine Glez in the past but new pregnancy. Patient is aware of shared practice. Please place orders. - - -

## 2021-06-14 NOTE — ED ADULT TRIAGE NOTE - BMI (KG/M2)
Assessment/Plan:    Patient Instructions     Hypertension stable on current medication     history of renal cyst due for ultrasound order provided     severe osteoarthritis right hip patient is anticipating a right hip replacement on February 22nd  He is due for routine labs which will he complete today, will need chest x-ray in light of history of tobacco abuse but he has been smoke free for the past 1 month congratulations!    In office EKG stable except for peaked T-waves, check electrolytes  Once labs and chest x-ray are obtained and reviewed clearance will be provided at that time  Patient understands no aspirin or anti inflammatory 1 week prior to surgery and to hold lisinopril 24 hours prior to surgery     addendum, no medical contraindications to proceed with surgery, 18 mm pulmonary nodule found on CT, currently following with pulmonology PET scan pending       Diagnoses and all orders for this visit:    Impaired fasting glucose    DEBORAH (obstructive sleep apnea)    Benign hypertension    Mixed hyperlipidemia    Complex renal cyst    Nicotine dependence with nicotine-induced disorder, unspecified nicotine product type    Preop examination  -     POCT ECG  -     XR chest pa & lateral; Future    Renal cyst  -     US retroperitoneal complete; Future         Subjective:      Patient ID: Cory Garza is a 52 y o  male     Patient here for routine visit, generally feeling well  Stop smoking right before [place]  Was having a lot of right-sided hip and lower back pain  Was ultimately found to have severe osteoarthritis in the right hip will require hip replacement scheduled on February 22nd add Latisha Francis  He has preoperative testing on the 26th  He is otherwise feeling well no chest pain shortness of breath palpitations, he is taking his blood pressure med daily, no home blood pressures          Current Outpatient Medications:     lisinopril (ZESTRIL) 40 mg tablet, Take 1 tablet (40 mg total) by mouth daily, Disp: 90 tablet, Rfl: 3    nystatin (MYCOSTATIN) cream, Apply topically 2 (two) times a day (Patient not taking: Reported on 4/23/2020), Disp: 30 g, Rfl: 2    No results found for this or any previous visit (from the past 1008 hour(s))  The following portions of the patient's history were reviewed and updated as appropriate: allergies, current medications, past family history, past medical history, past social history, past surgical history and problem list      Review of Systems   Constitutional: Negative for appetite change, chills, diaphoresis, fatigue, fever and unexpected weight change  HENT: Negative for postnasal drip and sneezing  Eyes: Negative for visual disturbance  Respiratory: Negative for chest tightness and shortness of breath  Cardiovascular: Negative for chest pain, palpitations and leg swelling  Gastrointestinal: Negative for abdominal pain and blood in stool  Endocrine: Negative for cold intolerance, heat intolerance, polydipsia, polyphagia and polyuria  Genitourinary: Negative for difficulty urinating, dysuria, frequency and urgency  Musculoskeletal: Negative for arthralgias and myalgias  Skin: Negative for rash and wound  Neurological: Negative for dizziness, weakness, light-headedness and headaches  Hematological: Negative for adenopathy  Psychiatric/Behavioral: Negative for confusion, dysphoric mood and sleep disturbance  The patient is not nervous/anxious  Objective:      /82 (BP Location: Left arm, Patient Position: Sitting, Cuff Size: Standard)   Pulse 84   Temp (!) 97 4 °F (36 3 °C) (Temporal) Comment: no nsaids  Resp 16   Ht 5' 10" (1 778 m)   Wt 93 4 kg (206 lb)   BMI 29 56 kg/m²        Physical Exam  Constitutional:       General: He is not in acute distress  Appearance: He is well-developed  He is not diaphoretic  HENT:      Head: Normocephalic and atraumatic        Nose: Nose normal    Eyes: Conjunctiva/sclera: Conjunctivae normal       Pupils: Pupils are equal, round, and reactive to light  Neck:      Musculoskeletal: Normal range of motion and neck supple  Thyroid: No thyromegaly  Vascular: No JVD  Trachea: No tracheal deviation  Cardiovascular:      Rate and Rhythm: Normal rate and regular rhythm  Heart sounds: Normal heart sounds  No murmur  No friction rub  No gallop  Pulmonary:      Effort: Pulmonary effort is normal  No respiratory distress  Breath sounds: Normal breath sounds  No wheezing or rales  Abdominal:      General: Bowel sounds are normal  There is no distension  Palpations: Abdomen is soft  Tenderness: There is no abdominal tenderness  Musculoskeletal: Normal range of motion  Lymphadenopathy:      Cervical: No cervical adenopathy  Skin:     General: Skin is warm and dry  Findings: No rash  Neurological:      Mental Status: He is alert and oriented to person, place, and time  Cranial Nerves: No cranial nerve deficit  Psychiatric:         Behavior: Behavior normal          Thought Content:  Thought content normal          Judgment: Judgment normal  24.6

## 2021-06-14 NOTE — ED ADULT NURSE REASSESSMENT NOTE - NS ED NURSE REASSESS COMMENT FT1
Assumed care of pt from previous RN, awaiting admission bed, IV antibiotics infusing, currently resting comfortably on stretcher, respirations even and unlabored, will continue to monitor closely.

## 2021-06-14 NOTE — ED PROVIDER NOTE - SKIN WOUND TYPE
+ wound to right foot over 4th/5th distal metatarsal, + surrounding swelling and erythema. no crepitus. cap refill <2 sec

## 2021-06-14 NOTE — ED ADULT NURSE NOTE - OBJECTIVE STATEMENT
26 year old male presents to the ED complaining of a foot injury. Patient admits to spraying his right foot with a  on Friday (4 days prior to arrival). Patient sustained a wound but did not seek medical attention because "he does not have insurance". Patient was using betadine and guaze to wrap foot since the injury but today the pain intensified, redness is noted, and right foot is now swollen. Redness noted to the top of the right foot. Top of the right  on palpation. Swelling, nonpitting edema noted. Wounds noted to right 3rd, 4th, 5th digits and top of the right foot. Patient moving extremity and all toes without difficulty. Patient denies numbness/tingling. Patient denies fever/chills. Patient admits otherwise he feels his usual self. Patient denies sick contacts or known COVID exposure. Patient fully vaccinated for COVID.

## 2021-06-14 NOTE — H&P ADULT - NSHPSOCIALHISTORY_GEN_ALL_CORE
Current 5-6 cig/day smoker x 10 years  Denies EtOH  Admits to smoking marijuana daily  Occupation: Unemployed  Received Pfizer vacc(2/2) in 3/2021

## 2021-06-14 NOTE — H&P ADULT - HISTORY OF PRESENT ILLNESS
27 yo m no significant pmh presents to the ed s/p injury to foot by . Pt states he was       Inn the ED CBC, CMP, lactate, foot x ray were performed. Significant for wbc 13.28, cmp wnl, lactate wnl. X ray performed but not yet read. Pt was given clindamycin 900mg x 1, 1L NS, percocet x 1.  25 y/o M w/ no significant pmhx presents to the ED s/p injury to R foot by . Pt states that about 3 days ago, when he was just about to finish up cleaning his friend's pool when he shot himself in the dorsal aspect of his R foot with a . At that point, he did endorse having his foot wound come into contact with "dirty water" that was used to clean. Has been washing his wound off with Iodine solution daily and has been taking Advil 600mg every 6 hours for the pain. Woke up this morning noticing redness and swelling over the dorsal aspect of his foot, but no drainage or foul odor. States the pain is so bad that he has been ambulating on his R heel. Currently has 7/10 pain. Has good range of motion. Pulses intact. Endorses having a Tetanus shot 2 years back. Denies any fevers, chills, nausea, vomiting.     In the ED CBC, CMP, lactate, foot x ray were performed. Significant for wbc 13.28, cmp wnl, lactate wnl. X ray performed but not yet read. Pt was given clindamycin 900mg x 1, 1L NS, percocet x 1.  25 y/o M w/ no significant pmhx presents to the ED s/p injury to R foot by . Pt states that about 3 days ago, when he was just about to finish up cleaning his friend's pool when he shot himself in the dorsal aspect of his R foot with a . At that point, he did endorse having his foot wound come into contact with "dirty water" that was used to clean. Has been washing his wound off with Iodine solution daily and has been taking Advil 600mg every 6 hours for the pain. Woke up this morning noticing redness and swelling over the dorsal aspect of his foot, but no drainage or foul odor. States the pain is so bad that he has been ambulating on his R heel. Currently has 7/10 pain. Has good range of motion. Pulses intact. Endorses having a Tetanus shot 2 years back. Denies any fevers, chills, nausea, vomiting.     In the ED CBC, CMP, lactate, foot x ray were performed. Significant for wbc 13.28, cmp wnl, lactate wnl. X ray performed but not yet read. Pt was given clindamycin 900mg x 1, 1L NS, percocet x 1  EKG:  25 y/o M w/ no significant pmhx presents to the ED s/p injury to R foot by . Pt states that about 3 days ago, when he was just about to finish up cleaning his friend's pool when he shot himself in the dorsal aspect of his R foot with a . At that point, he did endorse having his foot wound come into contact with "dirty water" that was used to clean. Has been washing his wound off with Iodine solution daily and has been taking Advil 600mg every 6 hours for the pain. Woke up this morning noticing redness and swelling over the dorsal aspect of his foot, but no drainage or foul odor. States the pain is so bad that he has been ambulating on his R heel. Currently has 7/10 pain. Has good range of motion. Pulses intact. Endorses having a Tetanus shot 2 years back. Denies any fevers, chills, nausea, vomiting.     In the ED CBC, CMP, lactate, R foot x-ray was performed. Significant for wbc 13.28, cmp wnl, lactate wnl. X ray performed but not yet read. Pt was given clindamycin 900mg x 1, 1L NS, percocet x 1  EKG: NSR w/ Sinus Arrythmia

## 2021-06-14 NOTE — H&P ADULT - PROBLEM SELECTOR PLAN 2
Mild leukocytosis on admission, likely 2/2 cellulitis  - On PO Clinda 600mg q8h x 7 days  - trend wbc Mild leukocytosis on admission, likely 2/2 cellulitis  - On PO Clinda 450mg q8h x 7 days  - trend wbc

## 2021-06-14 NOTE — H&P ADULT - NSHPLABSRESULTS_GEN_ALL_CORE
13.7   13.28 )-----------( 287      ( 14 Jun 2021 22:17 )             42.0     14 Jun 2021 22:17    140    |  107    |  11     ----------------------------<  78     3.7     |  26     |  0.80     Ca    8.7        14 Jun 2021 22:17    TPro  8.3    /  Alb  3.7    /  TBili  0.5    /  DBili  x      /  AST  23     /  ALT  22     /  AlkPhos  49     14 Jun 2021 22:17    LIVER FUNCTIONS - ( 14 Jun 2021 22:17 )  Alb: 3.7 g/dL / Pro: 8.3 g/dL / ALK PHOS: 49 U/L / ALT: 22 U/L / AST: 23 U/L / GGT: x             CAPILLARY BLOOD GLUCOSE

## 2021-06-15 ENCOUNTER — TRANSCRIPTION ENCOUNTER (OUTPATIENT)
Age: 26
End: 2021-06-15

## 2021-06-15 VITALS
SYSTOLIC BLOOD PRESSURE: 131 MMHG | HEART RATE: 55 BPM | TEMPERATURE: 98 F | OXYGEN SATURATION: 96 % | RESPIRATION RATE: 19 BRPM | DIASTOLIC BLOOD PRESSURE: 77 MMHG

## 2021-06-15 DIAGNOSIS — L03.115 CELLULITIS OF RIGHT LOWER LIMB: ICD-10-CM

## 2021-06-15 DIAGNOSIS — D72.829 ELEVATED WHITE BLOOD CELL COUNT, UNSPECIFIED: ICD-10-CM

## 2021-06-15 DIAGNOSIS — Z29.9 ENCOUNTER FOR PROPHYLACTIC MEASURES, UNSPECIFIED: ICD-10-CM

## 2021-06-15 DIAGNOSIS — L03.90 CELLULITIS, UNSPECIFIED: ICD-10-CM

## 2021-06-15 DIAGNOSIS — S91.309A UNSPECIFIED OPEN WOUND, UNSPECIFIED FOOT, INITIAL ENCOUNTER: ICD-10-CM

## 2021-06-15 LAB
ALBUMIN SERPL ELPH-MCNC: 3.3 G/DL — SIGNIFICANT CHANGE UP (ref 3.3–5)
ALP SERPL-CCNC: 44 U/L — SIGNIFICANT CHANGE UP (ref 40–120)
ALT FLD-CCNC: 22 U/L — SIGNIFICANT CHANGE UP (ref 12–78)
ANION GAP SERPL CALC-SCNC: 5 MMOL/L — SIGNIFICANT CHANGE UP (ref 5–17)
AST SERPL-CCNC: 14 U/L — LOW (ref 15–37)
BASOPHILS # BLD AUTO: 0.06 K/UL — SIGNIFICANT CHANGE UP (ref 0–0.2)
BASOPHILS NFR BLD AUTO: 0.5 % — SIGNIFICANT CHANGE UP (ref 0–2)
BILIRUB SERPL-MCNC: 0.5 MG/DL — SIGNIFICANT CHANGE UP (ref 0.2–1.2)
BUN SERPL-MCNC: 11 MG/DL — SIGNIFICANT CHANGE UP (ref 7–23)
CALCIUM SERPL-MCNC: 8.4 MG/DL — LOW (ref 8.5–10.1)
CHLORIDE SERPL-SCNC: 107 MMOL/L — SIGNIFICANT CHANGE UP (ref 96–108)
CO2 SERPL-SCNC: 29 MMOL/L — SIGNIFICANT CHANGE UP (ref 22–31)
CREAT SERPL-MCNC: 0.82 MG/DL — SIGNIFICANT CHANGE UP (ref 0.5–1.3)
EOSINOPHIL # BLD AUTO: 0.34 K/UL — SIGNIFICANT CHANGE UP (ref 0–0.5)
EOSINOPHIL NFR BLD AUTO: 2.9 % — SIGNIFICANT CHANGE UP (ref 0–6)
GLUCOSE SERPL-MCNC: 93 MG/DL — SIGNIFICANT CHANGE UP (ref 70–99)
HCT VFR BLD CALC: 39.3 % — SIGNIFICANT CHANGE UP (ref 39–50)
HGB BLD-MCNC: 12.9 G/DL — LOW (ref 13–17)
IMM GRANULOCYTES NFR BLD AUTO: 0.2 % — SIGNIFICANT CHANGE UP (ref 0–1.5)
LYMPHOCYTES # BLD AUTO: 4.99 K/UL — HIGH (ref 1–3.3)
LYMPHOCYTES # BLD AUTO: 42.3 % — SIGNIFICANT CHANGE UP (ref 13–44)
MCHC RBC-ENTMCNC: 28.5 PG — SIGNIFICANT CHANGE UP (ref 27–34)
MCHC RBC-ENTMCNC: 32.8 GM/DL — SIGNIFICANT CHANGE UP (ref 32–36)
MCV RBC AUTO: 86.8 FL — SIGNIFICANT CHANGE UP (ref 80–100)
MONOCYTES # BLD AUTO: 0.81 K/UL — SIGNIFICANT CHANGE UP (ref 0–0.9)
MONOCYTES NFR BLD AUTO: 6.9 % — SIGNIFICANT CHANGE UP (ref 2–14)
NEUTROPHILS # BLD AUTO: 5.58 K/UL — SIGNIFICANT CHANGE UP (ref 1.8–7.4)
NEUTROPHILS NFR BLD AUTO: 47.2 % — SIGNIFICANT CHANGE UP (ref 43–77)
NRBC # BLD: 0 /100 WBCS — SIGNIFICANT CHANGE UP (ref 0–0)
PLATELET # BLD AUTO: 261 K/UL — SIGNIFICANT CHANGE UP (ref 150–400)
POTASSIUM SERPL-MCNC: 3.8 MMOL/L — SIGNIFICANT CHANGE UP (ref 3.5–5.3)
POTASSIUM SERPL-SCNC: 3.8 MMOL/L — SIGNIFICANT CHANGE UP (ref 3.5–5.3)
PROT SERPL-MCNC: 7.4 G/DL — SIGNIFICANT CHANGE UP (ref 6–8.3)
RBC # BLD: 4.53 M/UL — SIGNIFICANT CHANGE UP (ref 4.2–5.8)
RBC # FLD: 12.8 % — SIGNIFICANT CHANGE UP (ref 10.3–14.5)
SARS-COV-2 RNA SPEC QL NAA+PROBE: SIGNIFICANT CHANGE UP
SODIUM SERPL-SCNC: 141 MMOL/L — SIGNIFICANT CHANGE UP (ref 135–145)
WBC # BLD: 11.8 K/UL — HIGH (ref 3.8–10.5)
WBC # FLD AUTO: 11.8 K/UL — HIGH (ref 3.8–10.5)

## 2021-06-15 PROCEDURE — 36415 COLL VENOUS BLD VENIPUNCTURE: CPT

## 2021-06-15 PROCEDURE — 80053 COMPREHEN METABOLIC PANEL: CPT

## 2021-06-15 PROCEDURE — 83605 ASSAY OF LACTIC ACID: CPT

## 2021-06-15 PROCEDURE — 85025 COMPLETE CBC W/AUTO DIFF WBC: CPT

## 2021-06-15 PROCEDURE — 99223 1ST HOSP IP/OBS HIGH 75: CPT | Mod: GC

## 2021-06-15 PROCEDURE — 73630 X-RAY EXAM OF FOOT: CPT

## 2021-06-15 PROCEDURE — 99285 EMERGENCY DEPT VISIT HI MDM: CPT

## 2021-06-15 PROCEDURE — 96361 HYDRATE IV INFUSION ADD-ON: CPT

## 2021-06-15 PROCEDURE — 93005 ELECTROCARDIOGRAM TRACING: CPT

## 2021-06-15 PROCEDURE — 93010 ELECTROCARDIOGRAM REPORT: CPT

## 2021-06-15 PROCEDURE — 99239 HOSP IP/OBS DSCHRG MGMT >30: CPT

## 2021-06-15 PROCEDURE — 96365 THER/PROPH/DIAG IV INF INIT: CPT

## 2021-06-15 PROCEDURE — 99222 1ST HOSP IP/OBS MODERATE 55: CPT

## 2021-06-15 PROCEDURE — 99253 IP/OBS CNSLTJ NEW/EST LOW 45: CPT

## 2021-06-15 PROCEDURE — 87635 SARS-COV-2 COVID-19 AMP PRB: CPT

## 2021-06-15 RX ORDER — ACETAMINOPHEN 500 MG
650 TABLET ORAL EVERY 4 HOURS
Refills: 0 | Status: DISCONTINUED | OUTPATIENT
Start: 2021-06-15 | End: 2021-06-15

## 2021-06-15 RX ORDER — FINASTERIDE 5 MG/1
1 TABLET, FILM COATED ORAL
Qty: 0 | Refills: 0 | DISCHARGE

## 2021-06-15 RX ORDER — OXYCODONE AND ACETAMINOPHEN 5; 325 MG/1; MG/1
1 TABLET ORAL EVERY 6 HOURS
Refills: 0 | Status: DISCONTINUED | OUTPATIENT
Start: 2021-06-15 | End: 2021-06-15

## 2021-06-15 RX ORDER — IBUPROFEN 200 MG
1 TABLET ORAL
Qty: 0 | Refills: 0 | DISCHARGE
Start: 2021-06-15

## 2021-06-15 RX ORDER — LACTOBACILLUS ACIDOPHILUS 100MM CELL
1 CAPSULE ORAL THREE TIMES A DAY
Refills: 0 | Status: DISCONTINUED | OUTPATIENT
Start: 2021-06-15 | End: 2021-06-15

## 2021-06-15 RX ORDER — IBUPROFEN 200 MG
600 TABLET ORAL EVERY 6 HOURS
Refills: 0 | Status: DISCONTINUED | OUTPATIENT
Start: 2021-06-15 | End: 2021-06-15

## 2021-06-15 RX ORDER — LACTOBACILLUS ACIDOPHILUS 100MM CELL
1 CAPSULE ORAL
Refills: 0 | Status: DISCONTINUED | OUTPATIENT
Start: 2021-06-15 | End: 2021-06-15

## 2021-06-15 RX ADMIN — Medication 1 TABLET(S): at 14:01

## 2021-06-15 RX ADMIN — Medication 100 MILLIGRAM(S): at 21:01

## 2021-06-15 RX ADMIN — Medication 100 MILLIGRAM(S): at 14:04

## 2021-06-15 RX ADMIN — Medication 1 TABLET(S): at 21:01

## 2021-06-15 RX ADMIN — Medication 1 TABLET(S): at 06:02

## 2021-06-15 RX ADMIN — OXYCODONE AND ACETAMINOPHEN 1 TABLET(S): 5; 325 TABLET ORAL at 01:46

## 2021-06-15 RX ADMIN — OXYCODONE AND ACETAMINOPHEN 1 TABLET(S): 5; 325 TABLET ORAL at 02:35

## 2021-06-15 RX ADMIN — Medication 100 MILLIGRAM(S): at 06:02

## 2021-06-15 NOTE — DISCHARGE NOTE NURSING/CASE MANAGEMENT/SOCIAL WORK - PATIENT PORTAL LINK FT
You can access the FollowMyHealth Patient Portal offered by [facility] by registering at the following website: [email or web address]. By joining [facility]’s FollowMyHealth portal, you will also be able to view your health information using other applications (apps) compatible with our system.

## 2021-06-15 NOTE — PROGRESS NOTE ADULT - ASSESSMENT
27 y/o M w/ no significant pmhx presents to the ED s/p injury to R foot by . Admitted for Cellulitis

## 2021-06-15 NOTE — PROGRESS NOTE ADULT - PROBLEM SELECTOR PLAN 3
Patient denies chest pain or shortness of breath.  appears more sleepy today Review of systems otherwise (-)  	  acetaminophen   Tablet .. 650 milliGRAM(s) Oral every 6 hours PRN  aspirin enteric coated 81 milliGRAM(s) Oral daily  cefepime   IVPB 1000 milliGRAM(s) IV Intermittent every 8 hours  collagenase Ointment 1 Application(s) Topical daily  Dakins Solution - Full Strength 1 Application(s) Topical once  dextrose 40% Gel 15 Gram(s) Oral once  dextrose 5% + sodium chloride 0.45%. 1000 milliLiter(s) IV Continuous <Continuous>  dextrose 50% Injectable 12.5 Gram(s) IV Push once  dextrose 50% Injectable 25 Gram(s) IV Push once  dextrose 50% Injectable 25 Gram(s) IV Push once  enoxaparin Injectable 40 milliGRAM(s) SubCutaneous daily  glucagon  Injectable 1 milliGRAM(s) IntraMuscular once  insulin lispro (ADMELOG) corrective regimen sliding scale   SubCutaneous three times a day before meals  insulin lispro (ADMELOG) corrective regimen sliding scale   SubCutaneous at bedtime  metroNIDAZOLE  IVPB 500 milliGRAM(s) IV Intermittent every 8 hours  simvastatin 40 milliGRAM(s) Oral at bedtime  vancomycin  IVPB 750 milliGRAM(s) IV Intermittent every 12 hours                            7.2    13.36 )-----------( 339      ( 15 Ender 2021 08:22 )             22.4       Hemoglobin: 7.2 g/dL (01-15 @ 08:22)  Hemoglobin: 7.4 g/dL (01-14 @ 07:45)  Hemoglobin: 8.7 g/dL (01-13 @ 07:43)  Hemoglobin: 9.0 g/dL (01-12 @ 07:34)  Hemoglobin: 9.0 g/dL (01-11 @ 06:00)      01-15    147<H>  |  119<H>  |  17  ----------------------------<  197<H>  3.1<L>   |  19<L>  |  0.61    Ca    8.1<L>      15 Ender 2021 08:22    TPro  4.6<L>  /  Alb  1.6<L>  /  TBili  0.3  /  DBili  x   /  AST  7<L>  /  ALT  <6<L>  /  AlkPhos  39<L>  01-15    Creatinine Trend: 0.61<--, 0.69<--, 0.68<--, 0.76<--, 0.93<--, 1.06<--    COAGS:           T(C): 37.1 (01-15-21 @ 05:28), Max: 37.1 (01-15-21 @ 05:28)  HR: 90 (01-15-21 @ 05:28) (81 - 90)  BP: 127/54 (01-15-21 @ 05:28) (112/46 - 127/54)  RR: 16 (01-15-21 @ 05:28) (16 - 18)  SpO2: 100% (01-15-21 @ 05:28) (100% - 100%)  Wt(kg): --    I&O's Summary      HEENT:  (-)icterus (-)pallor  CV: N S1 S2 1/6 ANGELIC (+)2 Pulses B/l  Resp:  Clear to ausculatation B/L, normal effort  GI: (+) BS Soft, NT, ND  Lymph:  (-)Edema, (-)obvious lymphadenopathy  Skin: Warm to touch, Normal turgor  Psych: Appropriate mood and affect      ASSESSMENT/PLAN: 	76y  Female  wheelchair bound with medical history significant of HTN, Diabetes, Osteoarthritis, Osteoporosis, Peripheral arterial disease, Diabetic neuropathy, HLD, Schizophrenia historically preserved LV and R function, comes in with worsening ulceration to b/l heels s/p debridement.    - s/p Partial calcanectomy of right foot   - preop lower extremity amputation   - optimized from a cardiac prospective for potential lower extremity amputation  - cont abx per primary team  - vascular f/u    Jm Cortes MD, [facility]  [facility] [phone]       Patient ambulatory.

## 2021-06-15 NOTE — DISCHARGE NOTE PROVIDER - PROVIDER TOKENS
FREE:[LAST:[PCP],PHONE:[(   )    -],FAX:[(   )    -],ADDRESS:[Please make appointment to see your PCP within 3-5 days]]

## 2021-06-15 NOTE — DISCHARGE NOTE PROVIDER - NSDCCPCAREPLAN_GEN_ALL_CORE_FT
PRINCIPAL DISCHARGE DIAGNOSIS  Diagnosis: Cellulitis of foot, right  Assessment and Plan of Treatment: You were hospitalized for cellulitis of the foot . You were tretaed with intravenous antibiotics with improvement. you were seen in consultation by the infectious disease team. Altough , I recommended additional day of IV antibiotics, you opted to be discharged today instead understading the potential risks and associated benefits inclduing recurrenc of infection.   Please complete course of oral antibiotics as prescribed and follow up with your primary care physician within 3-5 days for continued care and monitoring.  Return to the ED if you develop fever or recurrence of symptoms.

## 2021-06-15 NOTE — PROGRESS NOTE ADULT - SUBJECTIVE AND OBJECTIVE BOX
MEDICAL ATTENDING NOTE    Patient is a 26y old  Male who presents with a chief complaint of injury to lower extremity (14 Jun 2021 23:52)      INTERVAL HPI/OVERNIGHT EVENTS: offers no new complaints today; pain right foot improving    MEDICATIONS  (STANDING):  clindamycin IVPB 600 milliGRAM(s) IV Intermittent every 8 hours  lactobacillus acidophilus 1 Tablet(s) Oral three times a day    MEDICATIONS  (PRN):  acetaminophen   Tablet .. 650 milliGRAM(s) Oral every 4 hours PRN Mild Pain (1 - 3)  ibuprofen  Tablet. 600 milliGRAM(s) Oral every 6 hours PRN Moderate Pain (4 - 6)  oxycodone    5 mG/acetaminophen 325 mG 1 Tablet(s) Oral every 6 hours PRN Severe Pain (7 - 10)      __________________________________________________  ----------------------------------------------------------------------------------  REVIEW OF SYSTEMS: negative for chest pain or SOB; no palpitation; no diarrhea      Vital Signs Last 24 Hrs  T(C): 36.7 (15 Mauricio 2021 11:42), Max: 36.7 (14 Jun 2021 21:00)  T(F): 98.1 (15 Mauricio 2021 11:42), Max: 98.1 (14 Jun 2021 21:00)  HR: 63 (15 Mauricio 2021 11:42) (63 - 83)  BP: 121/76 (15 Mauricio 2021 11:42) (121/76 - 136/75)  RR: 16 (15 Mauricio 2021 11:42) (15 - 16)  SpO2: 98% (15 Mauricio 2021 11:42) (95% - 98%)    _________________  PHYSICAL EXAM:  ---------------------------   NAD; Normocephalic;   LUNGS - no wheezing  HEART: S1 S2+   ABDOMEN: Soft, Nontender, non distended, BS+  EXTREMITIES: no cyanosis; no edema; R foot erythema and dry wound at base of right 4th and fifth toes  NERVOUS SYSTEM:  Awake and alert; no focal neuro deficits appreciated    _________________________________________________  LABS:                        12.9   11.80 )-----------( 261      ( 15 Mauricio 2021 06:29 )             39.3     06-15    141  |  107  |  11  ----------------------------<  93  3.8   |  29  |  0.82    Ca    8.4<L>      15 Mauricio 2021 06:29    TPro  7.4  /  Alb  3.3  /  TBili  0.5  /  DBili  x   /  AST  14<L>  /  ALT  22  /  AlkPhos  44  06-15                          Plan of care was discussed with patient ; all questions and concerns were addressed and care was aligned with patient's wishes.

## 2021-06-15 NOTE — CONSULT NOTE ADULT - PROBLEM SELECTOR RECOMMENDATION 9
Pt evaluated and chart reviewed  Right foot dressed with Aquacel and DSD  No podiatry sx intervention at this time  Local wound care  All of pt's questions were answered to satisfaction  Pt stated he understood all discussed  Podiatry will follow pt while in house    Wound Care Instructions  1.  Keep dressing clean, dry and intact until clinic visit  2.  Make appointment to be seen by Dr. Reyes or Dr. Montague at [facility] w/in 3-5 days of d/c  3.  If symptoms of nausea, vomiting, fever, chills, shortness of breath, chest pain, increasing pain foot or posterior      leg pain develop - go to the emergency department

## 2021-06-15 NOTE — PHARMACOTHERAPY INTERVENTION NOTE - COMMENTS
Prescriptions filled at [facility].  Prescriptions: PO clindamycin  Brought to bedside and counseled.  [facility] name: Brianda Law, PharmD  Person(s) counseled: Patient  Counseling materials provided/counseling aids used: None  Time spent Counselin min  Counseling provided according to ASHP guidelines including side effects  Notes:  Patient counseled on indication, dose, and side effects. Patient expressed understanding and had no additional questions at this time.

## 2021-06-15 NOTE — DISCHARGE NOTE PROVIDER - HOSPITAL COURSE
HPI:  25 y/o M w/ no significant pmhx presents to the ED s/p injury to R foot by . Pt states that about 3 days ago, when he was just about to finish up cleaning his friend's pool when he shot himself in the dorsal aspect of his R foot with a . At that point, he did endorse having his foot wound come into contact with "dirty water" that was used to clean. Has been washing his wound off with Iodine solution daily and has been taking Advil 600mg every 6 hours for the pain. Woke up this morning noticing redness and swelling over the dorsal aspect of his foot, but no drainage or foul odor. States the pain is so bad that he has been ambulating on his R heel. Currently has 7/10 pain. Has good range of motion. Pulses intact. Endorses having a Tetanus shot 2 years back. Denies any fevers, chills, nausea, vomiting.     In the ED CBC, CMP, lactate, R foot x-ray was performed. Significant for wbc 13.28, cmp wnl, lactate wnl. X ray performed but not yet read. Pt was given clindamycin 900mg x 1, 1L NS, percocet x 1  EKG: NSR w/ Sinus Arrythmia (14 Jun 2021 23:52)      COURSE:   Patient was hospitalized due to cellulitis of the right foot following injury from a power wash machine. he was treated with IV antibiotics and seen in consultation by infectious disease attending. Patient was switched to oral antibiotics and discharged in stable medical condition.

## 2021-06-15 NOTE — DISCHARGE NOTE PROVIDER - CARE PROVIDER_API CALL
97.6
PCP,   Please make appointment to see your PCP within 3-5 days  Phone: (   )    -  Fax: (   )    -  Follow Up Time:

## 2021-06-15 NOTE — DISCHARGE NOTE PROVIDER - NSDCMRMEDTOKEN_GEN_ALL_CORE_FT
clindamycin 300 mg oral capsule: 2 cap(s) orally every 8 hours   ibuprofen 600 mg oral tablet: 1 tab(s) orally every 6 hours, As needed,  for pain

## 2021-06-15 NOTE — CONSULT NOTE ADULT - SUBJECTIVE AND OBJECTIVE BOX
26y year old Male seen at [facility] TELE 309 W1 for right dorsal foot wound caused by spraying his foot accidentally with a .  Pt said he had minimal pain in his right foot and at the time of the injury only superficial tissue was removed.  Pt said he was able to dorsiflex and plantarflex his toes with no pain.  Pt had no other pedal complaints.   Denies any fever, chills, nausea, vomiting, chest pain, shortness of breath, or calf pain at this time.    REVIEW OF SYSTEMS    Negative      PAST MEDICAL & SURGICAL HISTORY:  No pertinent past medical history    No significant past surgical history        Allergies    No Known Allergies    Intolerances        MEDICATIONS  (STANDING):  clindamycin IVPB 600 milliGRAM(s) IV Intermittent every 8 hours  lactobacillus acidophilus 1 Tablet(s) Oral three times a day    MEDICATIONS  (PRN):  acetaminophen   Tablet .. 650 milliGRAM(s) Oral every 4 hours PRN Mild Pain (1 - 3)  ibuprofen  Tablet. 600 milliGRAM(s) Oral every 6 hours PRN Moderate Pain (4 - 6)  oxycodone    5 mG/acetaminophen 325 mG 1 Tablet(s) Oral every 6 hours PRN Severe Pain (7 - 10)      Social History:  Current 5-6 cig/day smoker x 10 years  Denies EtOH  Admits to smoking marijuana daily  Occupation: Unemployed  Received Pfizer vacc(2/2) in 3/2021 (14 Jun 2021 23:52)      FAMILY HISTORY:  No pertinent family history in first degree relatives        Vital Signs Last 24 Hrs  T(C): 36.7 (15 Mauricio 2021 11:42), Max: 36.7 (14 Jun 2021 21:00)  T(F): 98.1 (15 Mauricio 2021 11:42), Max: 98.1 (14 Jun 2021 21:00)  HR: 63 (15 Mauricio 2021 11:42) (63 - 83)  BP: 121/76 (15 Mauricio 2021 11:42) (121/76 - 136/75)  BP(mean): --  RR: 16 (15 Mauricio 2021 11:42) (15 - 16)  SpO2: 98% (15 Mauricio 2021 11:42) (95% - 98%)        CBC Full  -  ( 15 Mauricio 2021 06:29 )  WBC Count : 11.80 K/uL  RBC Count : 4.53 M/uL  Hemoglobin : 12.9 g/dL  Hematocrit : 39.3 %  Platelet Count - Automated : 261 K/uL  Mean Cell Volume : 86.8 fl  Mean Cell Hemoglobin : 28.5 pg  Mean Cell Hemoglobin Concentration : 32.8 gm/dL  Auto Neutrophil # : 5.58 K/uL  Auto Lymphocyte # : 4.99 K/uL  Auto Monocyte # : 0.81 K/uL  Auto Eosinophil # : 0.34 K/uL  Auto Basophil # : 0.06 K/uL  Auto Neutrophil % : 47.2 %  Auto Lymphocyte % : 42.3 %  Auto Monocyte % : 6.9 %  Auto Eosinophil % : 2.9 %  Auto Basophil % : 0.5 %      ----------CHEM PANEL----------  06-15    141  |  107  |  11  ----------------------------<  93  3.8   |  29  |  0.82    Ca    8.4<L>      15 Mauricio 2021 06:29    TPro  7.4  /  Alb  3.3  /  TBili  0.5  /  DBili  x   /  AST  14<L>  /  ALT  22  /  AlkPhos  44  06-15      PHYSICAL EXAM:  Vascular: DP and PT palpable b/l.  CRT <3 seconds.  Nonpitting edema dorsal foot.  Erythema right dorsal forefoot.  No ecchymosis b/l  Neurological: Light touch intact to foot b/l.    Musculoskeletal: 5/5 strength in all quadrants bilaterally, AJ & STJ ROM intact  No POP wounds  Dermatological: Wounds(2)  1.  Right dorsal forefoot wound encompassing dorsal 3rd, 4th met heads, dorsal 3, 4, 5th digits.  Granular base.  2.0 x 1.5 x 0.1 cm.  Periwound intact and erythematous.  No PTB.  SOI(+).  No tunneling or undermining.  No drainage.  No purulence. No malodor            Imaging ---    EXAM:  XR FOOT COMP MIN 3 VIEWS RT                            PROCEDURE DATE:  06/14/2021        INTERPRETATION:  Right foot    HISTORY: Injury     Three views of the right foot show no evidence of fracture nor destructive change. The joint spaces are maintained. There appears to be a bipartite sesamoid bone of the first metatarsal.    IMPRESSION: No acute bony pathology.    Note - This does not exclude fractures in perfect alignment and apposition as presence may be indicated at one to three weeks following callus formation.      Thank you for this referral.            SHAWNA PERERA MD; Attending Interventional Radiologist  This document has been electronically signed. Mauricio 15 2021  1:24PM          
[facility]  INFECTIOUS DISEASES   [address] [place] [address]  Tel: [phone]     Fax: [phone]  =======================================================    [id number]  MAVERICK DUNLAP     CC: injury to lower extremity     HPI:  27 y/o man with no significant PMH was admitted on 6/14 with R foot injury with .  Foot had contact with dirty water, but since then washing and cleaning with betadine. Yesterday had a lot of pain and swelling in foot but also he was barbecuing and was standing on his feet the night before.  Since admission has been started on clindamycin and resting in bed today swelling is almost resolved and he feels better, moving his toes, wound with no discharge. No fever.     PAST MEDICAL & SURGICAL HISTORY:  No pertinent past medical history  No significant past surgical history    Social Hx: no smoking, ETOH or drugs     FAMILY HISTORY:  No pertinent family history in first degree relatives    Allergies  No Known Allergies    Antibiotics:  clindamycin IVPB 600 milliGRAM(s) IV Intermittent every 8 hours     REVIEW OF SYSTEMS:  CONSTITUTIONAL:  No Fever or chills  HEENT:  No diplopia or blurred vision.  No sore throat or runny nose.  CARDIOVASCULAR:  No chest pain or SOB.  RESPIRATORY:  No cough, shortness of breath, PND or orthopnea.  GASTROINTESTINAL:  No nausea, vomiting or diarrhea.  GENITOURINARY:  No dysuria, frequency or urgency. No Blood in urine  MUSCULOSKELETAL:  no joint aches, no muscle pain  SKIN:  R foot wound   NEUROLOGIC:  No paresthesias, fasciculations, seizures or weakness.  PSYCHIATRIC:  No disorder of thought or mood.  ENDOCRINE:  No heat or cold intolerance, polyuria or polydipsia.  HEMATOLOGICAL:  No easy bruising or bleeding.     Physical Exam:  Vital Signs Last 24 Hrs  T(C): 36.7 (15 Mauricio 2021 11:42), Max: 36.7 (14 Jun 2021 21:00)  T(F): 98.1 (15 Mauricio 2021 11:42), Max: 98.1 (14 Jun 2021 21:00)  HR: 63 (15 Mauricio 2021 11:42) (63 - 83)  BP: 121/76 (15 Mauricio 2021 11:42) (121/76 - 136/75)  RR: 16 (15 Mauricio 2021 11:42) (15 - 16)  SpO2: 98% (15 Mauricio 2021 11:42) (95% - 98%)  Height (cm): 180.3 (06-14 @ 20:10)  Weight (kg): 80 (06-14 @ 20:10)  BMI (kg/m2): 24.6 (06-14 @ 20:10)  BSA (m2): 2 (06-14 @ 20:10)  GEN: NAD  HEENT: normocephalic and atraumatic. EOMI. PERRL.    NECK: Supple.  No lymphadenopathy   LUNGS: Clear to auscultation.  HEART: Regular rate and rhythm without murmur.  ABDOMEN: Soft, nontender, and nondistended.  Positive bowel sounds.    : No CVA tenderness  EXTREMITIES: Without edema. R foot with superficial ulcer on top of 4th toe and dorsal aspect of foot, no discharge, mild swelling and warmth on dorsal side of foot. pulses are good.   NEUROLOGIC: grossly intact.  PSYCHIATRIC: Appropriate affect .  SKIN: No rash, as above    Labs:  06-15    141  |  107  |  11  ----------------------------<  93  3.8   |  29  |  0.82    Ca    8.4<L>      15 Mauricio 2021 06:29    TPro  7.4  /  Alb  3.3  /  TBili  0.5  /  DBili  x   /  AST  14<L>  /  ALT  22  /  AlkPhos  44  06-15                        12.9   11.80 )-----------( 261      ( 15 Mauricio 2021 06:29 )             39.3     LIVER FUNCTIONS - ( 15 Mauricio 2021 06:29 )  Alb: 3.3 g/dL / Pro: 7.4 g/dL / ALK PHOS: 44 U/L / ALT: 22 U/L / AST: 14 U/L / GGT: x           COVID-19 PCR: NotDetec (06-14-21 @ 23:06)    All imaging and other data have been reviewed.    < from: Xray Foot AP + Lateral + Oblique, Right (06.14.21 @ 21:57) >  EXAM:  XR FOOT COMP MIN 3 VIEWS RT                        PROCEDURE DATE:  06/14/2021    INTERPRETATION:  Right foot  HISTORY: Injury   Three views of the right foot show no evidence of fracture nor destructive change. The joint spaces are maintained. There appears to be a bipartite sesamoid bone of the first metatarsal.  IMPRESSION: No acute bony pathology.    Assessment and Plan:   27 y/o man with no significant PMH was admitted on 6/14 with R foot injury with . Mild cellulitis of dorsal foot, wound is not infected. Looks like responding to rest and ABx. Since clindamycin oral and IV has the same bioavailability can be taken orally. After his clinda doses today can be discharged.     Mild left foot cellulitis due to foot injury  - On Clindamycin IV 600mg q8  - After the last dose today can be discharged with 600mg q8h po for 7days.   - Follow up with PCP, if worsening can come back to ED.     Thank you for courtesy of this consult.     D/W Dr. Blanca.     Daniel Casillas MD  Division of Infectious Diseases   Cell [phone] between 8am and 6pm   After 6pm and weekends please call ID service at [phone].

## 2021-06-15 NOTE — DISCHARGE NOTE NURSING/CASE MANAGEMENT/SOCIAL WORK - NSDCVIVACCINE_GEN_ALL_CORE_FT
Rabies , 2018/5/31 22:22 , Bee Rousseau (RN)  Rabies , 2018/6/3 13:30 , Patrizia Birch (RN)  Rabies , 2018/6/7 22:52 , Bear Olmedo (RN)  Rabies , 2018/6/15 15:36 , Charissa Wolf (RN)

## 2021-06-15 NOTE — SBIRT NOTE ADULT - NSSBIRTDRGBRIEFINTDET_GEN_A_CORE
discussed with pt his disclosed marijuana use. Pt reported he does 5-6 times a day. explored motivation for harm reduction; pt advised he has goal to reduce to 2-3 times a day. He declined referrals for cessation.

## 2021-06-15 NOTE — PROGRESS NOTE ADULT - PROBLEM SELECTOR PLAN 1
continue IV antibiotics  ID consult  Local wound care  Pain control  Anticipate switch to oral antibiotics and discharge in 1-2 days if continues to improve.

## 2022-03-18 NOTE — ED PROVIDER NOTE - MEDICAL DECISION MAKING DETAILS
Progress Note -postop check    Assessment:  Vulvar cancer, status post radical complete vulvectomy, bilateral inguinal femoral lymph node dissection, postop day 0  Plan:  Out of bed as tolerated  Regular diet   Maintain [place]  Pain control  Labs in the morning    ______________________________________________________________________    Subjective:  Patient is doing well, complaining of no pain except in her left eye  She cannot describe the type of pain but she states that his pain  No history of eye injury, no history of migraine headaches, no change in vision  Patient denies any blood lesion, diplopia, discharge or redness  Patient is complaining of feeling hungry, wants coffee  Per nurse, there was no diet order  The    Vitals:  /64   Pulse 60   Temp (!) 97 3 °F (36 3 °C)   Resp 20   Ht 5' 5" (1 651 m)   Wt 97 1 kg (214 lb)   SpO2 97%   BMI 35 61 kg/m²     I/Os:  I/O last 3 completed shifts: In: 2050 [I V :1800; IV Piggyback:250]  Out: 200 [Urine:200]  No intake/output data recorded      Lab Results and Cultures:   CBC with diff: No results found for: WBC, HGB, HCT, MCV, PLT, ADJUSTEDWBC, MCH, MCHC, RDW, MPV, NRBC,   BMP/CMP:No results found for: NA, K, CL, CO2, ANIONGAP, BUN, CREATININE, GLUCOSE, CALCIUM, AST, ALT, ALKPHOS, PROT, BILITOT, EGFR,   Lipid Panel: No results found for: CHOL,   Coags: No results found for: PT, PTT, INR,     Blood Culture: No results found for: BLOODCX,   Urinalysis: No results found for: COLORU, CLARITYU, SPECGRAV, PHUR, LEUKOCYTESUR, NITRITE, PROTEINUA, GLUCOSEU, KETONESU, BILIRUBINUR, BLOODU,   Urine Culture: No results found for: URINECX,   Wound Culure: No results found for: WOUNDCULT    Medications:  Current Facility-Administered Medications   Medication Dose Route Frequency    acetaminophen (TYLENOL) tablet 650 mg  650 mg Oral Q6H [facility]    docusate sodium (COLACE) capsule 100 mg  100 mg Oral BID    HYDROmorphone (DILAUDID) injection 0 5 mg  0 5 mg Intravenous Q4H PRN    [START ON 3/19/2022] lisinopril (ZESTRIL) tablet 40 mg  40 mg Oral Daily    metoclopramide (REGLAN) injection 10 mg  10 mg Intravenous Q6H PRN    ondansetron (ZOFRAN) injection 4 mg  4 mg Intravenous Q6H PRN    oxyCODONE (ROXICODONE) IR tablet 2 5 mg  2 5 mg Oral Q4H PRN    Or    oxyCODONE (ROXICODONE) IR tablet 5 mg  5 mg Oral Q4H PRN         Physical Exam:    General:  Alert, oriented to place person and time, cooperative  CV:  Regular rate and rhythm  Respiratory:  Clear to auscultation bilaterally  Abdominal:  Soft, nontender, positive bowel sound  Extremities:  Good range of motion  Neurologic:  Grossly intact as tested  Drains are functioning, minimum blood-tinged drainage in the bulb        Froylan Nichols PA-C  3/18/2022 23 male here for final rabies vaccination

## 2022-05-22 ENCOUNTER — EMERGENCY (EMERGENCY)
Facility: HOSPITAL | Age: 27
LOS: 1 days | Discharge: HOME | End: 2022-05-22
Attending: EMERGENCY MEDICINE | Admitting: EMERGENCY MEDICINE
Payer: MEDICAID

## 2022-05-22 VITALS
OXYGEN SATURATION: 97 % | HEART RATE: 70 BPM | RESPIRATION RATE: 18 BRPM | SYSTOLIC BLOOD PRESSURE: 121 MMHG | TEMPERATURE: 98 F | DIASTOLIC BLOOD PRESSURE: 70 MMHG

## 2022-05-22 VITALS
OXYGEN SATURATION: 98 % | DIASTOLIC BLOOD PRESSURE: 58 MMHG | WEIGHT: 169.98 LBS | RESPIRATION RATE: 18 BRPM | TEMPERATURE: 97 F | HEART RATE: 73 BPM | HEIGHT: 71 IN | SYSTOLIC BLOOD PRESSURE: 108 MMHG

## 2022-05-22 PROCEDURE — 96372 THER/PROPH/DIAG INJ SC/IM: CPT

## 2022-05-22 PROCEDURE — 99284 EMERGENCY DEPT VISIT MOD MDM: CPT

## 2022-05-22 PROCEDURE — 72110 X-RAY EXAM L-2 SPINE 4/>VWS: CPT | Mod: 26

## 2022-05-22 PROCEDURE — 99283 EMERGENCY DEPT VISIT LOW MDM: CPT | Mod: 25

## 2022-05-22 PROCEDURE — 72110 X-RAY EXAM L-2 SPINE 4/>VWS: CPT

## 2022-05-22 RX ORDER — LIDOCAINE 4 G/100G
1 CREAM TOPICAL ONCE
Refills: 0 | Status: COMPLETED | OUTPATIENT
Start: 2022-05-22 | End: 2022-05-22

## 2022-05-22 RX ORDER — CYCLOBENZAPRINE HYDROCHLORIDE 10 MG/1
1 TABLET, FILM COATED ORAL
Qty: 12 | Refills: 0
Start: 2022-05-22 | End: 2022-05-25

## 2022-05-22 RX ORDER — TRAMADOL HYDROCHLORIDE 50 MG/1
1 TABLET ORAL
Qty: 12 | Refills: 0
Start: 2022-05-22 | End: 2022-05-25

## 2022-05-22 RX ORDER — DEXAMETHASONE 0.5 MG/5ML
10 ELIXIR ORAL ONCE
Refills: 0 | Status: COMPLETED | OUTPATIENT
Start: 2022-05-22 | End: 2022-05-22

## 2022-05-22 RX ORDER — LIDOCAINE 4 G/100G
1 CREAM TOPICAL
Qty: 5 | Refills: 0
Start: 2022-05-22 | End: 2022-05-26

## 2022-05-22 RX ORDER — KETOROLAC TROMETHAMINE 30 MG/ML
30 SYRINGE (ML) INJECTION ONCE
Refills: 0 | Status: DISCONTINUED | OUTPATIENT
Start: 2022-05-22 | End: 2022-05-22

## 2022-05-22 RX ORDER — CYCLOBENZAPRINE HYDROCHLORIDE 10 MG/1
10 TABLET, FILM COATED ORAL ONCE
Refills: 0 | Status: COMPLETED | OUTPATIENT
Start: 2022-05-22 | End: 2022-05-22

## 2022-05-22 RX ADMIN — LIDOCAINE 1 PATCH: 4 CREAM TOPICAL at 11:39

## 2022-05-22 RX ADMIN — Medication 30 MILLIGRAM(S): at 11:40

## 2022-05-22 RX ADMIN — CYCLOBENZAPRINE HYDROCHLORIDE 10 MILLIGRAM(S): 10 TABLET, FILM COATED ORAL at 11:39

## 2022-05-22 NOTE — ED PROVIDER NOTE - PROVIDER TOKENS
PROVIDER:[TOKEN:[[id number]],FOLLOWUP:[1-3 Days]],PROVIDER:[TOKEN:[[id number]],FOLLOWUP:[1-3 Days]]

## 2022-05-22 NOTE — ED PROVIDER NOTE - CLINICAL SUMMARY MEDICAL DECISION MAKING FREE TEXT BOX
DT: I have personally performed a face to face diagnostic evaluation on this patient.  I have reviewed the PA's note and agree with the history, exam, and plan of care, except as noted.  History and Exam by me shows back pain .  Patient is NAD.  A n O x 3. Head NC/AT. Lungs cta bl. Heart s1,s2, rrr, no murmurs. Abd-soft, nt, no guarding, no rebound, no distension, no cva tenderness. Ext- FROM actively,  ambulating s any difficulty.

## 2022-05-22 NOTE — ED PROVIDER NOTE - NS ED ATTENDING STATEMENT MOD
This was a shared visit with the SEEMA. I reviewed and verified the documentation and independently performed the documented:

## 2022-05-22 NOTE — ED PROVIDER NOTE - PATIENT PORTAL LINK FT
You can access the FollowMyHealth Patient Portal offered by [facility] by registering at the following website: [email or web address]. By joining "[employer]’s FollowMyHealth portal, you will also be able to view your health information using other applications (apps) compatible with our system.

## 2022-05-22 NOTE — ED PROVIDER NOTE - ATTESTATION, MLM
Declined AWV appt.   
I have reviewed and confirmed nurses' notes for patient's medications, allergies, medical history, and surgical history.

## 2022-05-22 NOTE — ED ADULT NURSE NOTE - OBJECTIVE STATEMENT
Pt p/w atraumatic low back pain, history of chronic back pain after injury last year. Per patient has "tingling and intermittent numbness" w/ position change. Denies any loss of bowel or bladder control

## 2022-05-22 NOTE — ED PROVIDER NOTE - CHPI ED SYMPTOMS NEG
no anorexia/no bladder dysfunction/no bowel dysfunction/no fatigue/no difficulty bearing weight/no motor function loss

## 2022-05-22 NOTE — ED PROVIDER NOTE - NSFOLLOWUPINSTRUCTIONS_ED_ALL_ED_FT
Follow up with Orthopedist and pain management for re-evaluation, ongoing care and treatment. Rest, weight bearing as tolerated, take medications as prescribed. If having worsening of symptoms, bowel or bladder incontinence, fever or other related symptoms, RETURN TO THE ER IMMEDIATELY.     Sciatica       Sciatica is pain, weakness, tingling, or loss of feeling (numbness) along the sciatic nerve. The sciatic nerve starts in the lower back and goes down the back of each leg. Sciatica usually goes away on its own or with treatment. Sometimes, sciatica may come back (recur).      What are the causes?    This condition happens when the sciatic nerve is pinched or has pressure put on it. This may be the result of:  •A disk in between the bones of the spine bulging out too far (herniated disk).      •Changes in the spinal disks that occur with aging.      •A condition that affects a muscle in the butt.      •Extra bone growth near the sciatic nerve.      •A break (fracture) of the area between your hip bones (pelvis).      •Pregnancy.       •Tumor. This is rare.        What increases the risk?    You are more likely to develop this condition if you:  •Play sports that put pressure or stress on the spine.      •Have poor strength and ease of movement (flexibility).      •Have had a back injury in the past.      •Have had back surgery.      •Sit for long periods of time.      •Do activities that involve bending or lifting over and over again.      •Are very overweight (obese).        What are the signs or symptoms?    Symptoms can vary from mild to very bad. They may include:•Any of these problems in the lower back, leg, hip, or butt:  •Mild tingling, loss of feeling, or dull aches.      •Burning sensations.      •Sharp pains.         •Loss of feeling in the back of the calf or the sole of the foot.      •Leg weakness.       •Very bad back pain that makes it hard to move.      These symptoms may get worse when you cough, sneeze, or laugh. They may also get worse when you sit or stand for long periods of time.      How is this treated?    This condition often gets better without any treatment. However, treatment may include:  •Changing or cutting back on physical activity when you have pain.      •Doing exercises and stretching.      •Putting ice or heat on the affected area.    •Medicines that help:   •To relieve pain and swelling.       •To relax your muscles.         •Shots (injections) of medicines that help to relieve pain, irritation, and swelling.      •Surgery.        Follow these instructions at home:    Medicines     •Take over-the-counter and prescription medicines only as told by your doctor.    •Ask your doctor if the medicine prescribed to you:  •Requires you to avoid driving or using heavy machinery.    •Can cause trouble pooping (constipation). You may need to take these steps to prevent or treat trouble pooping:  •Drink enough fluids to keep your pee (urine) pale yellow.      •Take over-the-counter or prescription medicines.      •Eat foods that are high in fiber. These include beans, whole grains, and fresh fruits and vegetables.      •Limit foods that are high in fat and sugar. These include fried or sweet foods.            Managing pain                 •If told, put ice on the affected area.  •Put ice in a plastic bag.      •Place a towel between your skin and the bag.      •Leave the ice on for 20 minutes, 2–3 times a day.      •If told, put heat on the affected area. Use the heat source that your doctor tells you to use, such as a moist heat pack or a heating pad.  •Place a towel between your skin and the heat source.      •Leave the heat on for 20–30 minutes.      •Remove the heat if your skin turns bright red. This is very important if you are unable to feel pain, heat, or cold. You may have a greater risk of getting burned.          Activity      •Return to your normal activities as told by your doctor. Ask your doctor what activities are safe for you.      •Avoid activities that make your symptoms worse.    •Take short rests during the day.  •When you rest for a long time, do some physical activity or stretching between periods of rest.      •Avoid sitting for a long time without moving. Get up and move around at least one time each hour.        •Exercise and stretch regularly, as told by your doctor.    • Do not lift anything that is heavier than 10 lb (4.5 kg) while you have symptoms of sciatica.  •Avoid lifting heavy things even when you do not have symptoms.      •Avoid lifting heavy things over and over.      •When you lift objects, always lift in a way that is safe for your body. To do this, you should:  •Bend your knees.      •Keep the object close to your body.      •Avoid twisting.        General instructions     •Stay at a healthy weight.      •Wear comfortable shoes that support your feet. Avoid wearing high heels.      •Avoid sleeping on a mattress that is too soft or too hard. You might have less pain if you sleep on a mattress that is firm enough to support your back.      •Keep all follow-up visits as told by your doctor. This is important.        Contact a doctor if:  •You have pain that:  •Wakes you up when you are sleeping.      •Gets worse when you lie down.      •Is worse than the pain you have had in the past.      •Lasts longer than 4 weeks.        •You lose weight without trying.        Get help right away if:    •You cannot control when you pee (urinate) or poop (have a bowel movement).    •You have weakness in any of these areas and it gets worse:  •Lower back.      •The area between your hip bones.      •Butt.      •Legs.        •You have redness or swelling of your back.      •You have a burning feeling when you pee.        Summary    •Sciatica is pain, weakness, tingling, or loss of feeling (numbness) along the sciatic nerve.      •This condition happens when the sciatic nerve is pinched or has pressure put on it.      •Sciatica can cause pain, tingling, or loss of feeling (numbness) in the lower back, legs, hips, and butt.      •Treatment often includes rest, exercise, medicines, and putting ice or heat on the affected area.      This information is not intended to replace advice given to you by your health care provider. Make sure you discuss any questions you have with your health care provider.

## 2022-05-22 NOTE — ED PROVIDER NOTE - MUSCULOSKELETAL, MLM
Spine appears normal, range of motion is not limited, no muscle or joint tenderness, FROM RLE, no swelling or erythema noted, no point tenderness lumbar/sacral spine or R buttock, toes warm & mobile, nl cap refill, distal pulses and sensation intact, no foot drop, FROM RLE, NVI

## 2022-05-22 NOTE — ED PROVIDER NOTE - CARE PROVIDER_API CALL
Nancy Leahc (DO)  Orthopaedic Surgery Surgery  [address], [address]  [place] [address]  Phone: [phone]  Fax: [phone]  Follow Up Time: 1-3 Days    Marvin Harry)  Anesthesiology; Pain Medicine  [address] [place] [address]  Phone: [phone]  Fax: [phone]  Follow Up Time: 1-3 Days

## 2022-05-22 NOTE — ED PROVIDER NOTE - OBJECTIVE STATEMENT
26 y/o M with hx of chronic back pain s/p injury last year presents with c/o worsening right lower back/buttock pain radiating down right leg x several months. Pt states that pain is worse over the past two weeks with "tingling and intermittent numbness" in right leg with positional change x 2 weeks. States that he was seen in urgent care two weeks ago and given muscle relaxers and steroids without improvement. Pt has also been taking nsaids without relief. Denies seeing orthopedist. Denies fever, chills, abdominal pain, n/v, urinary symptoms, loss of bowel or bladder control, focal weakness, calf pain/swelling or other symptoms.

## 2022-05-22 NOTE — ED PROVIDER NOTE - PROGRESS NOTE DETAILS
Reevaluated patient at bedside.  Patient feeling much improved.  Discussed the results of all diagnostic testing in ED and copies of all reports given.   An opportunity to ask questions was given.  Discussed the importance of prompt, close medical follow-up.  Patient will return with any changes, concerns or persistent / worsening symptoms.  Understanding of all instructions verbalized. Reevaluated patient at bedside.  Patient feeling much improved.  Discussed the results of all diagnostic testing in ED and copies of all reports given. Advised will rx tramadol, steroids and muscle relaxers, advised f/u with ortho and pain management. Strict return precautions given.  An opportunity to ask questions was given.  Discussed the importance of prompt, close medical follow-up.  Patient will return with any changes, concerns or persistent / worsening symptoms.  Understanding of all instructions verbalized. Reevaluated patient at bedside.  Patient feeling much improved.  Discussed the results of all diagnostic testing in ED and copies of all reports given. Advised will rx tramadol, steroids and muscle relaxers, advised f/u with ortho and pain management. Strict return precautions given. Pt ambulatory without difficulty. An opportunity to ask questions was given.  Discussed the importance of prompt, close medical follow-up.  Patient will return with any changes, concerns or persistent / worsening symptoms.  Understanding of all instructions verbalized.

## 2022-05-23 ENCOUNTER — APPOINTMENT (OUTPATIENT)
Dept: ORTHOPEDIC SURGERY | Facility: CLINIC | Age: 27
End: 2022-05-23
Payer: MEDICAID

## 2022-05-23 VITALS
WEIGHT: 170 LBS | HEIGHT: 71 IN | DIASTOLIC BLOOD PRESSURE: 76 MMHG | SYSTOLIC BLOOD PRESSURE: 133 MMHG | HEART RATE: 73 BPM | BODY MASS INDEX: 23.8 KG/M2

## 2022-05-23 DIAGNOSIS — G89.29 LUMBAGO WITH SCIATICA, RIGHT SIDE: ICD-10-CM

## 2022-05-23 DIAGNOSIS — M54.41 LUMBAGO WITH SCIATICA, RIGHT SIDE: ICD-10-CM

## 2022-05-23 PROCEDURE — 99204 OFFICE O/P NEW MOD 45 MIN: CPT

## 2022-05-24 ENCOUNTER — EMERGENCY (EMERGENCY)
Facility: HOSPITAL | Age: 27
LOS: 1 days | Discharge: ANOTHER HOSPITAL | End: 2022-05-24
Attending: STUDENT IN AN ORGANIZED HEALTH CARE EDUCATION/TRAINING PROGRAM | Admitting: STUDENT IN AN ORGANIZED HEALTH CARE EDUCATION/TRAINING PROGRAM
Payer: MEDICAID

## 2022-05-24 VITALS
OXYGEN SATURATION: 100 % | WEIGHT: 169.98 LBS | RESPIRATION RATE: 15 BRPM | DIASTOLIC BLOOD PRESSURE: 65 MMHG | TEMPERATURE: 97 F | HEIGHT: 71 IN | HEART RATE: 57 BPM | SYSTOLIC BLOOD PRESSURE: 105 MMHG

## 2022-05-24 PROCEDURE — 93010 ELECTROCARDIOGRAM REPORT: CPT

## 2022-05-24 PROCEDURE — 99291 CRITICAL CARE FIRST HOUR: CPT

## 2022-05-24 RX ORDER — ONDANSETRON 8 MG/1
4 TABLET, FILM COATED ORAL ONCE
Refills: 0 | Status: COMPLETED | OUTPATIENT
Start: 2022-05-24 | End: 2022-05-24

## 2022-05-24 RX ORDER — MORPHINE SULFATE 50 MG/1
4 CAPSULE, EXTENDED RELEASE ORAL ONCE
Refills: 0 | Status: DISCONTINUED | OUTPATIENT
Start: 2022-05-24 | End: 2022-05-24

## 2022-05-24 RX ORDER — DEXAMETHASONE 0.5 MG/5ML
6 ELIXIR ORAL ONCE
Refills: 0 | Status: DISCONTINUED | OUTPATIENT
Start: 2022-05-24 | End: 2022-05-28

## 2022-05-24 RX ORDER — SODIUM CHLORIDE 9 MG/ML
1000 INJECTION INTRAMUSCULAR; INTRAVENOUS; SUBCUTANEOUS ONCE
Refills: 0 | Status: COMPLETED | OUTPATIENT
Start: 2022-05-24 | End: 2022-05-24

## 2022-05-24 RX ADMIN — SODIUM CHLORIDE 1000 MILLILITER(S): 9 INJECTION INTRAMUSCULAR; INTRAVENOUS; SUBCUTANEOUS at 23:45

## 2022-05-24 RX ADMIN — SODIUM CHLORIDE 1000 MILLILITER(S): 9 INJECTION INTRAMUSCULAR; INTRAVENOUS; SUBCUTANEOUS at 22:45

## 2022-05-24 NOTE — REASON FOR VISIT
[Initial Visit] : an initial visit for [Herniated Discs] : herniated discs [Back Pain] : back pain [Radiculopathy] : radiculopathy [Parent] : parent

## 2022-05-24 NOTE — ED PROVIDER NOTE - CRITICAL CARE ATTENDING CONTRIBUTION TO CARE
Upon my evaluation, this patient had a high probability of imminent or life-threatening deterioration due to __rule out cord compression_______, which required my direct attention, intervention, and personal management.  The patient has a  medical condition that impairs one or more vital organ systems.  Frequent personal assessment and adjustment of medical interventions was performed.      I have personally provided _60__ minutes of critical care time exclusive of time spent on separately billable procedures. Time includes review of laboratory data, radiology results, discussion with consultants, patient and family; monitoring for potential decompensation, as well as time spent retrieving data and reviewing the chart and documenting the visit. Interventions were performed as documented above.

## 2022-05-24 NOTE — ED ADULT NURSE REASSESSMENT NOTE - NS ED NURSE REASSESS COMMENT FT1
Bladder scan completed. 584 cc of urine measured to be in bladder. Pt unable to void. Dr. Schrader made aware. will continue to monitor.

## 2022-05-24 NOTE — ED PROVIDER NOTE - CLINICAL SUMMARY MEDICAL DECISION MAKING FREE TEXT BOX
28yo M with worsening back pain, assoc with urinary retention, concner for cord compression, pt to be transferred emergently to [place] for MRI, spine eval   had likely vagal syncope in setting of inability to urinate

## 2022-05-24 NOTE — ED PROVIDER NOTE - PHYSICAL EXAMINATION
Gen: uncomfortable  Head: NC/AT  Neck: trachea midline  cv: bradycardic   Resp:  No distress  Ext: no deformities  Neuro:  A&O appears non focal, slight decreased strength with extension of right foot, no saddle anesthesia, slight decreased sensation right lateral thigh, rectal tone intact   Skin:  Warm and dry as visualized  Psych:  Normal affect and mood

## 2022-05-24 NOTE — PHYSICAL EXAM
[de-identified] : He moves with difficulty.  He has pain standing upright.  There are no cutaneous abnormalities of the palpable bony defects of the spine.  There is no evidence of shortness of breath.  Range of motion of the spine is painful.  His lower extremity neurological examination revealed 1-2+ symmetrical reflexes.  Motor power is normal to manual testing in all lower extremity groups.  There is some spotty decrease sensation to light touch in the right lower extremity in a nonanatomic distribution.  Straight leg raising in the sitting position at 60 degrees bilaterally causes back and right leg pain.  Vascular examination shows no evidence of varicosities and there is no lymphedema.  There are no cutaneous abnormalities of the upper or lower extremities. [de-identified] : I reviewed an outside x-ray of the spine from the emergency room.  Sagittal alignment is normal and there are no destructive changes.  There was a large amount of abdominal gas.  There are no destructive changes.

## 2022-05-24 NOTE — ED PROVIDER NOTE - PROGRESS NOTE DETAILS
pt unable to urinate 584 cc in bladder, attempted to urinate so we could obtain post void but pt unable, he is refusing mccrary   DIMITRI tolbert spoke with ortho PA< pt needs to be transferred for urgent MRI ro cord compression  pt requesting plastics for lac repair initially, now does not want plastics, however pt to be a tier 1 transfer to [place] for concern for cord compression, unable to repair lac prior to transfer, pt currently at CT head.   DIMITRI tolbert initiated transfer, will be tier 1, steroids ordered

## 2022-05-24 NOTE — ED PROVIDER NOTE - CARE PLAN
1 Principal Discharge DX:	Back pain  Secondary Diagnosis:	Urinary retention  Secondary Diagnosis:	Syncope

## 2022-05-24 NOTE — DISCUSSION/SUMMARY
[Medication Risks Reviewed] : Medication risks reviewed [de-identified] : The current symptoms are consistent with a herniated disc.  I have recommended rest and moist heat.  The ibuprofen at 12 to 1800 mg a day has not been helping.  He has been switched to diclofenac 75 mg twice a day as a nonsteroidal anti-inflammatory.  He will call if there are problems with the medication or worsening of his symptoms and I will see him for follow-up in 3-1/2 weeks.  I have asked him to return with clarification of how much prednisone he has taken over the last month or so.  We discussed that the next step normally if the nonsteroidal anti-inflammatory medication show no improvement would be a course of oral steroids but he can only take a given amount of steroids in a given period of time and I need to know what he has had in the past.  If the steroids do not work the next step is an MRI and possible epidural steroid injections.

## 2022-05-24 NOTE — HISTORY OF PRESENT ILLNESS
[de-identified] : I saw him over 3 years ago with complaints of neck and upper back pain as well as lower back pain that apparently quickly got better with ibuprofen.  Year or so ago he had a foot injury when he power washed his toes and spent a good period of time walking on his right heel and then developed lower back pain that he grades as an 8 with radiation to the right buttock, lateral thigh and lateral calf that have increased significantly over the last 3 months and he grades as a 9.  He has not had left leg pain.  He has had some numbness and tingling in the right leg.  There is no Valsalva effect.  He has had night pain.  The pain is worse with standing and walking and to a slightly lesser extent with sitting and driving.  He has had a couple of visits to emergency rooms.  He is had some prednisone but I do not know how much he has had.  He has been on Flexeril and Robaxin on and off and has been taking ibuprofen 600 mg 2 or 3 times a day.  He also had some tramadol prescribed. [Pain Location] : pain [Worsening] : worsening [9] : a current pain level of 9/10 [Walking] : walking [Sitting] : sitting [Standing] : standing [Lifting] : lifting
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
## 2022-05-25 ENCOUNTER — EMERGENCY (EMERGENCY)
Facility: HOSPITAL | Age: 27
LOS: 1 days | Discharge: HOME | End: 2022-05-25
Attending: EMERGENCY MEDICINE
Payer: MEDICAID

## 2022-05-25 VITALS
RESPIRATION RATE: 17 BRPM | TEMPERATURE: 98 F | OXYGEN SATURATION: 97 % | HEART RATE: 64 BPM | DIASTOLIC BLOOD PRESSURE: 77 MMHG | SYSTOLIC BLOOD PRESSURE: 123 MMHG

## 2022-05-25 VITALS
HEART RATE: 65 BPM | SYSTOLIC BLOOD PRESSURE: 128 MMHG | OXYGEN SATURATION: 99 % | DIASTOLIC BLOOD PRESSURE: 90 MMHG | RESPIRATION RATE: 16 BRPM | TEMPERATURE: 98 F

## 2022-05-25 VITALS
DIASTOLIC BLOOD PRESSURE: 81 MMHG | HEART RATE: 57 BPM | SYSTOLIC BLOOD PRESSURE: 127 MMHG | OXYGEN SATURATION: 100 % | TEMPERATURE: 98 F | RESPIRATION RATE: 18 BRPM

## 2022-05-25 LAB
ALBUMIN SERPL ELPH-MCNC: 3.4 G/DL — SIGNIFICANT CHANGE UP (ref 3.3–5)
ALBUMIN SERPL ELPH-MCNC: 3.9 G/DL — SIGNIFICANT CHANGE UP (ref 3.3–5)
ALP SERPL-CCNC: 42 U/L — SIGNIFICANT CHANGE UP (ref 40–120)
ALP SERPL-CCNC: 44 U/L — SIGNIFICANT CHANGE UP (ref 40–120)
ALT FLD-CCNC: 27 U/L — SIGNIFICANT CHANGE UP (ref 10–45)
ALT FLD-CCNC: 38 U/L — SIGNIFICANT CHANGE UP (ref 12–78)
ANION GAP SERPL CALC-SCNC: 3 MMOL/L — LOW (ref 5–17)
ANION GAP SERPL CALC-SCNC: 9 MMOL/L — SIGNIFICANT CHANGE UP (ref 5–17)
APTT BLD: 27.1 SEC — LOW (ref 27.5–35.5)
APTT BLD: 27.2 SEC — LOW (ref 27.5–35.5)
AST SERPL-CCNC: 21 U/L — SIGNIFICANT CHANGE UP (ref 10–40)
AST SERPL-CCNC: 25 U/L — SIGNIFICANT CHANGE UP (ref 15–37)
BASOPHILS # BLD AUTO: 0 K/UL — SIGNIFICANT CHANGE UP (ref 0–0.2)
BASOPHILS # BLD AUTO: 0.06 K/UL — SIGNIFICANT CHANGE UP (ref 0–0.2)
BASOPHILS NFR BLD AUTO: 0 % — SIGNIFICANT CHANGE UP (ref 0–2)
BASOPHILS NFR BLD AUTO: 0.3 % — SIGNIFICANT CHANGE UP (ref 0–2)
BILIRUB SERPL-MCNC: 0.2 MG/DL — SIGNIFICANT CHANGE UP (ref 0.2–1.2)
BILIRUB SERPL-MCNC: 0.2 MG/DL — SIGNIFICANT CHANGE UP (ref 0.2–1.2)
BLD GP AB SCN SERPL QL: NEGATIVE — SIGNIFICANT CHANGE UP
BUN SERPL-MCNC: 18 MG/DL — SIGNIFICANT CHANGE UP (ref 7–23)
BUN SERPL-MCNC: 18 MG/DL — SIGNIFICANT CHANGE UP (ref 7–23)
CALCIUM SERPL-MCNC: 8.6 MG/DL — SIGNIFICANT CHANGE UP (ref 8.5–10.1)
CALCIUM SERPL-MCNC: 8.7 MG/DL — SIGNIFICANT CHANGE UP (ref 8.4–10.5)
CHLORIDE SERPL-SCNC: 103 MMOL/L — SIGNIFICANT CHANGE UP (ref 96–108)
CHLORIDE SERPL-SCNC: 105 MMOL/L — SIGNIFICANT CHANGE UP (ref 96–108)
CK MB CFR SERPL CALC: <1 NG/ML — SIGNIFICANT CHANGE UP (ref 0–3.6)
CO2 SERPL-SCNC: 28 MMOL/L — SIGNIFICANT CHANGE UP (ref 22–31)
CO2 SERPL-SCNC: 33 MMOL/L — HIGH (ref 22–31)
CREAT SERPL-MCNC: 0.78 MG/DL — SIGNIFICANT CHANGE UP (ref 0.5–1.3)
CREAT SERPL-MCNC: 0.84 MG/DL — SIGNIFICANT CHANGE UP (ref 0.5–1.3)
EGFR: 123 ML/MIN/1.73M2 — SIGNIFICANT CHANGE UP
EGFR: 125 ML/MIN/1.73M2 — SIGNIFICANT CHANGE UP
EOSINOPHIL # BLD AUTO: 0.12 K/UL — SIGNIFICANT CHANGE UP (ref 0–0.5)
EOSINOPHIL # BLD AUTO: 0.23 K/UL — SIGNIFICANT CHANGE UP (ref 0–0.5)
EOSINOPHIL NFR BLD AUTO: 0.7 % — SIGNIFICANT CHANGE UP (ref 0–6)
EOSINOPHIL NFR BLD AUTO: 2 % — SIGNIFICANT CHANGE UP (ref 0–6)
GLUCOSE SERPL-MCNC: 113 MG/DL — HIGH (ref 70–99)
GLUCOSE SERPL-MCNC: 114 MG/DL — HIGH (ref 70–99)
HCT VFR BLD CALC: 41 % — SIGNIFICANT CHANGE UP (ref 39–50)
HCT VFR BLD CALC: 43.8 % — SIGNIFICANT CHANGE UP (ref 39–50)
HGB BLD-MCNC: 12.7 G/DL — LOW (ref 13–17)
HGB BLD-MCNC: 13.9 G/DL — SIGNIFICANT CHANGE UP (ref 13–17)
IMM GRANULOCYTES NFR BLD AUTO: 0.6 % — SIGNIFICANT CHANGE UP (ref 0–1.5)
INR BLD: 1.11 RATIO — SIGNIFICANT CHANGE UP (ref 0.88–1.16)
INR BLD: 1.14 RATIO — SIGNIFICANT CHANGE UP (ref 0.88–1.16)
LYMPHOCYTES # BLD AUTO: 17.4 % — SIGNIFICANT CHANGE UP (ref 13–44)
LYMPHOCYTES # BLD AUTO: 3.1 K/UL — SIGNIFICANT CHANGE UP (ref 1–3.3)
LYMPHOCYTES # BLD AUTO: 55 % — HIGH (ref 13–44)
LYMPHOCYTES # BLD AUTO: 6.38 K/UL — HIGH (ref 1–3.3)
MCHC RBC-ENTMCNC: 27.7 PG — SIGNIFICANT CHANGE UP (ref 27–34)
MCHC RBC-ENTMCNC: 28.5 PG — SIGNIFICANT CHANGE UP (ref 27–34)
MCHC RBC-ENTMCNC: 31 GM/DL — LOW (ref 32–36)
MCHC RBC-ENTMCNC: 31.7 GM/DL — LOW (ref 32–36)
MCV RBC AUTO: 89.3 FL — SIGNIFICANT CHANGE UP (ref 80–100)
MCV RBC AUTO: 89.8 FL — SIGNIFICANT CHANGE UP (ref 80–100)
MONOCYTES # BLD AUTO: 0.58 K/UL — SIGNIFICANT CHANGE UP (ref 0–0.9)
MONOCYTES # BLD AUTO: 1.03 K/UL — HIGH (ref 0–0.9)
MONOCYTES NFR BLD AUTO: 5 % — SIGNIFICANT CHANGE UP (ref 2–14)
MONOCYTES NFR BLD AUTO: 5.8 % — SIGNIFICANT CHANGE UP (ref 2–14)
NEUTROPHILS # BLD AUTO: 13.42 K/UL — HIGH (ref 1.8–7.4)
NEUTROPHILS # BLD AUTO: 3.94 K/UL — SIGNIFICANT CHANGE UP (ref 1.8–7.4)
NEUTROPHILS NFR BLD AUTO: 34 % — LOW (ref 43–77)
NEUTROPHILS NFR BLD AUTO: 75.2 % — SIGNIFICANT CHANGE UP (ref 43–77)
NRBC # BLD: 0 /100 WBCS — SIGNIFICANT CHANGE UP (ref 0–0)
NRBC # BLD: SIGNIFICANT CHANGE UP /100 WBCS (ref 0–0)
PLATELET # BLD AUTO: 271 K/UL — SIGNIFICANT CHANGE UP (ref 150–400)
PLATELET # BLD AUTO: 282 K/UL — SIGNIFICANT CHANGE UP (ref 150–400)
POTASSIUM SERPL-MCNC: 3.5 MMOL/L — SIGNIFICANT CHANGE UP (ref 3.5–5.3)
POTASSIUM SERPL-MCNC: 3.8 MMOL/L — SIGNIFICANT CHANGE UP (ref 3.5–5.3)
POTASSIUM SERPL-SCNC: 3.5 MMOL/L — SIGNIFICANT CHANGE UP (ref 3.5–5.3)
POTASSIUM SERPL-SCNC: 3.8 MMOL/L — SIGNIFICANT CHANGE UP (ref 3.5–5.3)
PROT SERPL-MCNC: 6.3 G/DL — SIGNIFICANT CHANGE UP (ref 6–8.3)
PROT SERPL-MCNC: 6.8 G/DL — SIGNIFICANT CHANGE UP (ref 6–8.3)
PROTHROM AB SERPL-ACNC: 13 SEC — SIGNIFICANT CHANGE UP (ref 10.5–13.4)
PROTHROM AB SERPL-ACNC: 13.1 SEC — SIGNIFICANT CHANGE UP (ref 10.5–13.4)
RBC # BLD: 4.59 M/UL — SIGNIFICANT CHANGE UP (ref 4.2–5.8)
RBC # BLD: 4.88 M/UL — SIGNIFICANT CHANGE UP (ref 4.2–5.8)
RBC # FLD: 13.5 % — SIGNIFICANT CHANGE UP (ref 10.3–14.5)
RBC # FLD: 13.7 % — SIGNIFICANT CHANGE UP (ref 10.3–14.5)
RH IG SCN BLD-IMP: POSITIVE — SIGNIFICANT CHANGE UP
SARS-COV-2 RNA SPEC QL NAA+PROBE: SIGNIFICANT CHANGE UP
SODIUM SERPL-SCNC: 140 MMOL/L — SIGNIFICANT CHANGE UP (ref 135–145)
SODIUM SERPL-SCNC: 141 MMOL/L — SIGNIFICANT CHANGE UP (ref 135–145)
TROPONIN I, HIGH SENSITIVITY RESULT: 18.6 NG/L — SIGNIFICANT CHANGE UP
WBC # BLD: 11.6 K/UL — HIGH (ref 3.8–10.5)
WBC # BLD: 17.83 K/UL — HIGH (ref 3.8–10.5)
WBC # FLD AUTO: 11.6 K/UL — HIGH (ref 3.8–10.5)
WBC # FLD AUTO: 17.83 K/UL — HIGH (ref 3.8–10.5)

## 2022-05-25 PROCEDURE — 86850 RBC ANTIBODY SCREEN: CPT

## 2022-05-25 PROCEDURE — 96374 THER/PROPH/DIAG INJ IV PUSH: CPT

## 2022-05-25 PROCEDURE — 85610 PROTHROMBIN TIME: CPT

## 2022-05-25 PROCEDURE — 80053 COMPREHEN METABOLIC PANEL: CPT

## 2022-05-25 PROCEDURE — 85730 THROMBOPLASTIN TIME PARTIAL: CPT

## 2022-05-25 PROCEDURE — 93005 ELECTROCARDIOGRAM TRACING: CPT

## 2022-05-25 PROCEDURE — 12011 RPR F/E/E/N/L/M 2.5 CM/<: CPT

## 2022-05-25 PROCEDURE — 72125 CT NECK SPINE W/O DYE: CPT | Mod: 26,MA

## 2022-05-25 PROCEDURE — 86900 BLOOD TYPING SEROLOGIC ABO: CPT

## 2022-05-25 PROCEDURE — 96375 TX/PRO/DX INJ NEW DRUG ADDON: CPT

## 2022-05-25 PROCEDURE — 85025 COMPLETE CBC W/AUTO DIFF WBC: CPT

## 2022-05-25 PROCEDURE — 96375 TX/PRO/DX INJ NEW DRUG ADDON: CPT | Mod: XU

## 2022-05-25 PROCEDURE — 12013 RPR F/E/E/N/L/M 2.6-5.0 CM: CPT

## 2022-05-25 PROCEDURE — 70450 CT HEAD/BRAIN W/O DYE: CPT | Mod: MA

## 2022-05-25 PROCEDURE — 86901 BLOOD TYPING SEROLOGIC RH(D): CPT

## 2022-05-25 PROCEDURE — 84484 ASSAY OF TROPONIN QUANT: CPT

## 2022-05-25 PROCEDURE — 96374 THER/PROPH/DIAG INJ IV PUSH: CPT | Mod: XU

## 2022-05-25 PROCEDURE — 72148 MRI LUMBAR SPINE W/O DYE: CPT | Mod: MA

## 2022-05-25 PROCEDURE — 82553 CREATINE MB FRACTION: CPT

## 2022-05-25 PROCEDURE — 99285 EMERGENCY DEPT VISIT HI MDM: CPT | Mod: 25

## 2022-05-25 PROCEDURE — 93005 ELECTROCARDIOGRAM TRACING: CPT | Mod: XU

## 2022-05-25 PROCEDURE — 93010 ELECTROCARDIOGRAM REPORT: CPT | Mod: 59

## 2022-05-25 PROCEDURE — 72125 CT NECK SPINE W/O DYE: CPT | Mod: MA

## 2022-05-25 PROCEDURE — 96361 HYDRATE IV INFUSION ADD-ON: CPT

## 2022-05-25 PROCEDURE — 70450 CT HEAD/BRAIN W/O DYE: CPT | Mod: 26,MA

## 2022-05-25 PROCEDURE — 36415 COLL VENOUS BLD VENIPUNCTURE: CPT

## 2022-05-25 PROCEDURE — 87635 SARS-COV-2 COVID-19 AMP PRB: CPT

## 2022-05-25 PROCEDURE — 72148 MRI LUMBAR SPINE W/O DYE: CPT | Mod: 26,MA

## 2022-05-25 RX ORDER — TETANUS TOXOID, REDUCED DIPHTHERIA TOXOID AND ACELLULAR PERTUSSIS VACCINE, ADSORBED 5; 2.5; 8; 8; 2.5 [IU]/.5ML; [IU]/.5ML; UG/.5ML; UG/.5ML; UG/.5ML
0.5 SUSPENSION INTRAMUSCULAR ONCE
Refills: 0 | Status: DISCONTINUED | OUTPATIENT
Start: 2022-05-25 | End: 2022-05-25

## 2022-05-25 RX ORDER — ACETAMINOPHEN 500 MG
975 TABLET ORAL ONCE
Refills: 0 | Status: COMPLETED | OUTPATIENT
Start: 2022-05-25 | End: 2022-05-25

## 2022-05-25 RX ORDER — MORPHINE SULFATE 50 MG/1
4 CAPSULE, EXTENDED RELEASE ORAL ONCE
Refills: 0 | Status: DISCONTINUED | OUTPATIENT
Start: 2022-05-25 | End: 2022-05-25

## 2022-05-25 RX ORDER — DIAZEPAM 5 MG
1 TABLET ORAL
Qty: 9 | Refills: 0
Start: 2022-05-25 | End: 2022-05-27

## 2022-05-25 RX ORDER — KETOROLAC TROMETHAMINE 30 MG/ML
15 SYRINGE (ML) INJECTION ONCE
Refills: 0 | Status: DISCONTINUED | OUTPATIENT
Start: 2022-05-25 | End: 2022-05-25

## 2022-05-25 RX ADMIN — Medication 15 MILLIGRAM(S): at 04:01

## 2022-05-25 RX ADMIN — ONDANSETRON 4 MILLIGRAM(S): 8 TABLET, FILM COATED ORAL at 00:24

## 2022-05-25 RX ADMIN — MORPHINE SULFATE 4 MILLIGRAM(S): 50 CAPSULE, EXTENDED RELEASE ORAL at 00:40

## 2022-05-25 RX ADMIN — MORPHINE SULFATE 4 MILLIGRAM(S): 50 CAPSULE, EXTENDED RELEASE ORAL at 04:01

## 2022-05-25 RX ADMIN — Medication 1 TABLET(S): at 00:24

## 2022-05-25 RX ADMIN — Medication 975 MILLIGRAM(S): at 04:00

## 2022-05-25 RX ADMIN — MORPHINE SULFATE 4 MILLIGRAM(S): 50 CAPSULE, EXTENDED RELEASE ORAL at 00:24

## 2022-05-25 NOTE — ED ADULT NURSE REASSESSMENT NOTE - NS ED NURSE REASSESS COMMENT FT1
Pt sat on side of stretcher and voided in to urinal. Approximately 600cc's of urine in urinal. MD Gooden made aware. Pt given MRI paper to fill out. Pending transport to MRI,

## 2022-05-25 NOTE — ED PROVIDER NOTE - PHYSICAL EXAMINATION
Physical Exam:  Gen: NAD, AOx3, non-toxic appearing  Head: NCAT  HEENT: EOMI, PEERLA, normal conjunctiva, tongue midline, oral mucosa moist  Lung: CTAB, no respiratory distress, no wheezes/rhonchi/rales B/L, speaking in full sentences  CV: RRR, no murmurs, rubs or gallops, distal pulses 2+ b/l  Abd: soft, NT, ND  MSK: no visible deformities, ROM normal in UE/LE, mild r lower back and lumbar midline TTP  Neuro: No focal sensory or motor deficits except for decreased plantar flexion 4+/5 R foot  Skin: Warm, well perfused, no rash, 2 cm lac to lateral to R brow, 4 cm lac to R maxilla w/o vermillion border involvement  Psych: normal affect, calm

## 2022-05-25 NOTE — ED ADULT NURSE NOTE - PLAN OF CARE
Call bell/Explanation of exam/test/Fall precautions/Bedside visitors/I and O/NPO/Position of comfort/Side rails

## 2022-05-25 NOTE — ED PROVIDER NOTE - CLINICAL SUMMARY MEDICAL DECISION MAKING FREE TEXT BOX
26yo male transfer from OSH for r/o cauda equina. Mild decreased plantar flexion with midline and R lower back TTP on exam, unable to urinate here in ED. Pending MRI. CT head/C spine done at OSH pending final read. Lacs repaired, syncope likely vasovagal from trying to urinate.

## 2022-05-25 NOTE — ED ADULT NURSE NOTE - OBJECTIVE STATEMENT
28yo M with no PMH presents to ED via EMS transfer from [place] to r/o cord compression. Pt states, "I have been having what seems like psoatica pain going down my leg 28yo M with no PMH presents to ED via EMS transfer from [place] to r/o cord compression. Pt states, "I have been having what seems like psoatica pain going down my leg for a few days. I have been also experiencing intermittent back pain with difficulty urinating. I was trying to urinate and I was unable to when my mom said that I might need to get a catheter put it and I got nauseous and passed out. I hit my head and loss consciousness." Pt also endorses having RLE weakness but unsure if this is due to the pain. Denies chest pain, SOB, trauma/injury, recent fall, fevers/chills, sick contact. A&Ox3. Strong peripheral pulses. Neurologically intact and follows commands, moves all extremities, see neuro flow sheet in patient's paper chart. Abdomen soft, nondistended, nontender to palpation. Slight tenderness to palpation of bladder, patient refusing catheter at this time, wants to try and urinate on his own. Lac to R eyebrow and R upper lip. NSR on cardiac monitor. Father at bedside. Stretcher locked and in lowest position, side rails up. Patient instructed to notify RN if assistance is needed.

## 2022-05-25 NOTE — ED PROVIDER NOTE - NSFOLLOWUPINSTRUCTIONS_ED_ALL_ED_FT
- Follow up with your doctor or return to the ER in 5 days for suture removal (3 near your eyebrown and 5 above your lip)  - Do not bump or hit the suture area. This could open the wound. Do not trim or shorten the ends of your stitches. If they rub on your clothing, put a gauze bandage between the stitches and your clothes  - Keep the wound dry and covered for 24 hours then routinely gently wash with soap and water  - Do not bathe or swim while sutures are in place  - For mouth and lip wounds, rinse your mouth after meals and at bedtime. Ask your healthcare provider what to use to rinse your mouth. If you have a scalp wound, you may gently wash your hair every 2 days with mild shampoo. Do not use hair products, such as hair spray.  - Contact your healthcare provider if you have fever and chills, your wound is red, warm, swollen, or leaking pus, there is a bad smell coming from your wound, you have increased pain in the wound area or you have questions or concerns about your condition or care.  - Return to the emergency department if your stitches come apart, blood soaks through your bandages, you suddenly cannot move your injured joint, you have sudden numbness around your wound, you see red streaks coming from your wound.   - For pain, take tylenol as directed on the packaging  - Take valium once every 8 hours as needed for severe pain -- causes drowsiness; DO NOT drink alcohol, drive, or operate heavy machinery with this medication.  Caution federal law prohibits the transfer of this drug to any person other  than the person for whom it was prescribed. May cause drowsiness.  Alcohol may intensify this effect.  Use care when operating dangerous machinery.  This prescription cannot be refilled. Using more of this medication than prescribed may cause serious breathing problems   - Follow up with your orthopedist as scheduled  - You were given copies of labs and/or imaging results if applicable, please take them to your follow up appointments  - Return to the ER for inability to urinate, severe back pain despite pain medication, inability to walk, weakness/numbness in legs or any worsening symptoms or concerns

## 2022-05-25 NOTE — ED PROVIDER NOTE - PATIENT PORTAL LINK FT
You can access the FollowMyHealth Patient Portal offered by [facility] by registering at the following website: [email or web address]. By joining [employer]’s FollowMyHealth portal, you will also be able to view your health information using other applications (apps) compatible with our system.
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
## 2022-05-25 NOTE — ED ADULT NURSE NOTE - OBJECTIVE STATEMENT
27 yr old male c/o syncope. A+O x 4. Parents at the bedside. Pt reports he was trying to urinate when he "passed out in the bathroom and hit a granite counter top." Pt was previously seen in the ED for lower back pain and discharged with a stable xray. Pt reports he last voided at 11 am this morning. Pt reports he has naomi having progressively worsened back pain x 3 -4 months, severely increasing over the past week. 584 cc of urine in bladder as demonstrated by a bladder scan. Pt refusing straight or mccrary catheter. Pt attempted to urinate several times while in the ED, but was unsuccessful. Pt reports lower back pain. Pain scale 10/10. Pt also reports numbness/tingling. Pt gait is unsteady and now requires assistance to stand or ambulate. Pt was Independently ambulatory this morning. Laceration noted to nose and forehead. Pt is awake and alert, but ot cooperative at this time. NSR on ekg and monitor. Normotensive. Resp even and unlabored on room air. Lungs clear alan. + BS in all quadrants. Bladder distended and tender to light palpation. Decreased sensistivity to light touch of the BLE's. will continue to monitor.

## 2022-05-25 NOTE — ED PROVIDER NOTE - PROGRESS NOTE DETAILS
Attending Polinaom:  rads called in order to get mr Berkley PGY3: pain resolved after medication, reevaluated, full strength in lower extremities, no weakness in R plantar flexion as previously noted. Urinated in ED. If MRI negative for cord compression will dc. Brekley PGY3: PVR 46 cc's Berkley PGY3: Patient reevaluated and feeling better. VSS. Ambulatory. Reviewed and discussed results with patient. Discussed importance of follow up and return precautions. Has ortho appointment in 3 weeks. Patient agrees with plan. Has diclofenac and tramadol at home.
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
## 2022-05-27 ENCOUNTER — NON-APPOINTMENT (OUTPATIENT)
Age: 27
End: 2022-05-27

## 2022-05-31 ENCOUNTER — NON-APPOINTMENT (OUTPATIENT)
Age: 27
End: 2022-05-31

## 2022-05-31 ENCOUNTER — EMERGENCY (EMERGENCY)
Facility: HOSPITAL | Age: 27
LOS: 1 days | Discharge: HOME | End: 2022-05-31
Attending: EMERGENCY MEDICINE
Payer: COMMERCIAL

## 2022-05-31 VITALS
HEART RATE: 60 BPM | DIASTOLIC BLOOD PRESSURE: 68 MMHG | TEMPERATURE: 98 F | OXYGEN SATURATION: 98 % | SYSTOLIC BLOOD PRESSURE: 130 MMHG | RESPIRATION RATE: 18 BRPM

## 2022-05-31 VITALS
HEIGHT: 71 IN | TEMPERATURE: 98 F | WEIGHT: 169.98 LBS | HEART RATE: 97 BPM | OXYGEN SATURATION: 98 % | SYSTOLIC BLOOD PRESSURE: 121 MMHG | RESPIRATION RATE: 18 BRPM | DIASTOLIC BLOOD PRESSURE: 75 MMHG

## 2022-05-31 LAB
APPEARANCE UR: CLEAR — SIGNIFICANT CHANGE UP
BILIRUB UR-MCNC: NEGATIVE — SIGNIFICANT CHANGE UP
COLOR SPEC: YELLOW — SIGNIFICANT CHANGE UP
DIFF PNL FLD: NEGATIVE — SIGNIFICANT CHANGE UP
GLUCOSE UR QL: NEGATIVE MG/DL — SIGNIFICANT CHANGE UP
KETONES UR-MCNC: NEGATIVE — SIGNIFICANT CHANGE UP
LEUKOCYTE ESTERASE UR-ACNC: NEGATIVE — SIGNIFICANT CHANGE UP
NITRITE UR-MCNC: NEGATIVE — SIGNIFICANT CHANGE UP
PH UR: 7 — SIGNIFICANT CHANGE UP (ref 5–8)
PROT UR-MCNC: NEGATIVE — SIGNIFICANT CHANGE UP
SP GR SPEC: 1.01 — SIGNIFICANT CHANGE UP (ref 1.01–1.02)
UROBILINOGEN FLD QL: NEGATIVE MG/DL — SIGNIFICANT CHANGE UP

## 2022-05-31 PROCEDURE — 96372 THER/PROPH/DIAG INJ SC/IM: CPT

## 2022-05-31 PROCEDURE — 81003 URINALYSIS AUTO W/O SCOPE: CPT

## 2022-05-31 PROCEDURE — 72131 CT LUMBAR SPINE W/O DYE: CPT | Mod: MA

## 2022-05-31 PROCEDURE — 99284 EMERGENCY DEPT VISIT MOD MDM: CPT | Mod: 25

## 2022-05-31 PROCEDURE — 72148 MRI LUMBAR SPINE W/O DYE: CPT | Mod: 26,MA

## 2022-05-31 PROCEDURE — 99285 EMERGENCY DEPT VISIT HI MDM: CPT

## 2022-05-31 PROCEDURE — 72148 MRI LUMBAR SPINE W/O DYE: CPT | Mod: MA

## 2022-05-31 PROCEDURE — 72131 CT LUMBAR SPINE W/O DYE: CPT | Mod: 26,MA

## 2022-05-31 RX ORDER — DEXAMETHASONE 0.5 MG/5ML
10 ELIXIR ORAL ONCE
Refills: 0 | Status: COMPLETED | OUTPATIENT
Start: 2022-05-31 | End: 2022-05-31

## 2022-05-31 RX ORDER — IBUPROFEN 200 MG
600 TABLET ORAL ONCE
Refills: 0 | Status: COMPLETED | OUTPATIENT
Start: 2022-05-31 | End: 2022-05-31

## 2022-05-31 RX ORDER — LIDOCAINE 4 G/100G
1 CREAM TOPICAL ONCE
Refills: 0 | Status: COMPLETED | OUTPATIENT
Start: 2022-05-31 | End: 2022-05-31

## 2022-05-31 RX ORDER — OXYCODONE AND ACETAMINOPHEN 5; 325 MG/1; MG/1
1 TABLET ORAL ONCE
Refills: 0 | Status: DISCONTINUED | OUTPATIENT
Start: 2022-05-31 | End: 2022-05-31

## 2022-05-31 RX ORDER — DIAZEPAM 5 MG
5 TABLET ORAL ONCE
Refills: 0 | Status: DISCONTINUED | OUTPATIENT
Start: 2022-05-31 | End: 2022-05-31

## 2022-05-31 RX ADMIN — Medication 600 MILLIGRAM(S): at 13:16

## 2022-05-31 RX ADMIN — OXYCODONE AND ACETAMINOPHEN 1 TABLET(S): 5; 325 TABLET ORAL at 23:34

## 2022-05-31 RX ADMIN — LIDOCAINE 1 PATCH: 4 CREAM TOPICAL at 13:15

## 2022-05-31 RX ADMIN — OXYCODONE AND ACETAMINOPHEN 1 TABLET(S): 5; 325 TABLET ORAL at 17:27

## 2022-05-31 RX ADMIN — Medication 5 MILLIGRAM(S): at 13:17

## 2022-05-31 RX ADMIN — Medication 10 MILLIGRAM(S): at 13:17

## 2022-05-31 NOTE — ED PROCEDURE NOTE - PROCEDURE ADDITIONAL DETAILS
Educational Bedside U/S performed, Bladder US showed 270 ml PVR, Dr. Burgos as supervisor. Explained to pt u/s study educational and not diagnostic and verbal consent provided.

## 2022-05-31 NOTE — ED ADULT NURSE REASSESSMENT NOTE - NS ED NURSE REASSESS COMMENT FT1
Neuro at bedside for pt evaluation
pt endorsing improvement of pain.  Awaiting MRI.  Pt and family updated on POC
Pre void bladder scanner: >560ml  Voided: 610ml  post Void: 30ml

## 2022-05-31 NOTE — ED PROVIDER NOTE - CARE PROVIDER_API CALL
João Lima)  Neurosurgery  [address]  [place] [address]  Phone: [phone]  Fax: [phone]  Follow Up Time: 4-6 Days
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
## 2022-05-31 NOTE — ED PROVIDER NOTE - CLINICAL SUMMARY MEDICAL DECISION MAKING FREE TEXT BOX
Patient is a 27yoM c/o of a 2 day hx of decreased urination and decreased urinary stream in setting of low back pain/RLE radiculopathy. Pt given valium, ibuprofen, lidocaine patch, and dex with relief. NSG consulted and recommended MRI which was consistent with CT - both showing lumbar spinal stenosis; MRI showing thecal sac compression. NSG to reassess following pre/post void bladder scan

## 2022-05-31 NOTE — ED PROVIDER NOTE - NSFOLLOWUPINSTRUCTIONS_ED_ALL_ED_FT
-A prescription for percocet and steroid pack was sent to your pharmacy  -Please follow up with Dr. Lima  -Please return if you have any new/worse/concerning symptoms including an inability to urinate

## 2022-05-31 NOTE — CONSULT NOTE ADULT - NSCONSULTADDITIONALINFOA_GEN_ALL_CORE
NSGY Attg:    see above    patient seen and examined by PA staff    imaging reviewed    agree with exam and plan as above  PVR 30 (normal)

## 2022-05-31 NOTE — CHART NOTE - NSCHARTNOTEFT_GEN_A_CORE
Imaging reviewed w/ attending. Pre >560 and post residual 30 cc. Patient and father requesting to leave soon. Discussed imaging results bedside and advised to call Dr. Lima's office in AM to schedule a follow up appt. Advised, if any worsening numbness, tingling, urinary issues w/ voiding/sensation, to call office or return to ED if severe. Both patient and father understand and agree. Pain control per ED.     MR Lumbar Spine No Cont (05.31.22 @ 20:32)   IMPRESSION:  L4-L5 large broad-based disc protrusion contacts the bilateral descending   L5 nerve roots in the lateral recesses and results in mild ventral thecal   sac compression.

## 2022-05-31 NOTE — ED PROVIDER NOTE - PROGRESS NOTE DETAILS
Edd: I rechecked the patient. He appears comfortable in fetal position however is in too much pain to ambulate. Awaiting neurosurgery consult Edd: DIALLO requesting pre and post void residual. DESIREE Garcia aware. MRI results in, pt and family updated on MRI results.

## 2022-05-31 NOTE — ED PROCEDURE NOTE - ATTENDING CONTRIBUTION TO CARE
I, Emil Burgos, performed the initial face to face bedside interview with this patient regarding history of present illness, review of symptoms and relevant past medical, social and family history.  I completed an independent physical examination.  I was the initial provider who evaluated this patient. I have signed out the follow up of any pending tests (i.e. labs, radiological studies) to the resident.  I have communicated the patient’s plan of care and disposition with the resident
I have participated in and supervised all key portions of the above procedures and agree with the above documentation.
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
## 2022-05-31 NOTE — ED ADULT NURSE NOTE - NSIMPLEMENTINTERV_GEN_ALL_ED
Implemented All Fall Risk Interventions:  [place] to call system. Call bell, personal items and telephone within reach. Instruct patient to call for assistance. Room bathroom lighting operational. Non-slip footwear when patient is off stretcher. Physically safe environment: no spills, clutter or unnecessary equipment. Stretcher in lowest position, wheels locked, appropriate side rails in place. Provide visual cue, wrist band, yellow gown, etc. Monitor gait and stability. Monitor for mental status changes and reorient to person, place, and time. Review medications for side effects contributing to fall risk. Reinforce activity limits and safety measures with patient and family.

## 2022-05-31 NOTE — ED PROCEDURE NOTE - CPROC ED TIME OUT STATEMENT1
“Patient's name, , procedure and correct site were confirmed during the [place] Timeout.”
“Patient's name, , procedure and correct site were confirmed during the [place] Timeout.”

## 2022-05-31 NOTE — CONSULT NOTE ADULT - ASSESSMENT
28 y/o male who has had low back pain with right lower extremity radiculopathy for 1 year, with worsening back pain & radicular pain over the last month, and he feels he is straining to urinate.     1. Repeat MRI L Spine stat with new urinary symptoms  2. Pre void, post void bladder scan needs to be done  3. After MRI is completed, further plan to follow.

## 2022-05-31 NOTE — ED PROVIDER NOTE - ATTENDING CONTRIBUTION TO CARE
The patient seen and examined    Spinal Stenosis    I, Emil Burgos, performed the initial face to face bedside interview with this patient regarding history of present illness, review of symptoms and relevant past medical, social and family history.  I completed an independent physical examination.  I was the initial provider who evaluated this patient. I have signed out the follow up of any pending tests (i.e. labs, radiological studies) to the resident.  I have communicated the patient’s plan of care and disposition with the resident The patient seen and examined    Spinal Stenosis    I, Emil Burgos, performed the initial face to face bedside interview with this patient regarding history of present illness, review of symptoms and relevant past medical, social and family history.  I completed an independent physical examination.  I was the initial provider who evaluated this patient. I have signed out the follow up of any pending tests (i.e. labs, radiological studies) to the PA student  I have communicated the patient’s plan of care and disposition with the PA student.
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
## 2022-05-31 NOTE — ED PROVIDER NOTE - PATIENT PORTAL LINK FT
You can access the FollowMyHealth Patient Portal offered by [facility] by registering at the following website: [email or web address]. By joining [facility]’s FollowMyHealth portal, you will also be able to view your health information using other applications (apps) compatible with our system.

## 2022-05-31 NOTE — ED ADULT TRIAGE NOTE - CHIEF COMPLAINT QUOTE
bulging disc between L5 and L4 against sciatic nerve, was given steroids 2 weeks ago by urgent care. now c/o difficulty urinating and now not  having the sensation to urinate, st went this morning

## 2022-06-02 ENCOUNTER — APPOINTMENT (OUTPATIENT)
Dept: NEUROSURGERY | Facility: CLINIC | Age: 27
End: 2022-06-02

## 2022-06-02 ENCOUNTER — NON-APPOINTMENT (OUTPATIENT)
Age: 27
End: 2022-06-02

## 2022-06-02 VITALS
TEMPERATURE: 99.2 F | HEIGHT: 71 IN | DIASTOLIC BLOOD PRESSURE: 78 MMHG | WEIGHT: 170 LBS | OXYGEN SATURATION: 97 % | BODY MASS INDEX: 23.8 KG/M2 | HEART RATE: 84 BPM | SYSTOLIC BLOOD PRESSURE: 129 MMHG

## 2022-06-02 PROCEDURE — 99214 OFFICE O/P EST MOD 30 MIN: CPT

## 2022-06-03 PROBLEM — M54.30 SCIATICA, UNSPECIFIED SIDE: Chronic | Status: ACTIVE | Noted: 2022-05-31

## 2022-06-06 ENCOUNTER — NON-APPOINTMENT (OUTPATIENT)
Age: 27
End: 2022-06-06

## 2022-06-09 ENCOUNTER — APPOINTMENT (OUTPATIENT)
Dept: PHYSICAL MEDICINE AND REHAB | Facility: CLINIC | Age: 27
End: 2022-06-09
Payer: MEDICAID

## 2022-06-09 VITALS
HEIGHT: 71 IN | SYSTOLIC BLOOD PRESSURE: 133 MMHG | DIASTOLIC BLOOD PRESSURE: 81 MMHG | RESPIRATION RATE: 14 BRPM | WEIGHT: 170 LBS | BODY MASS INDEX: 23.8 KG/M2 | HEART RATE: 65 BPM

## 2022-06-09 DIAGNOSIS — M79.18 MYALGIA, OTHER SITE: ICD-10-CM

## 2022-06-09 DIAGNOSIS — Z78.9 OTHER SPECIFIED HEALTH STATUS: ICD-10-CM

## 2022-06-09 DIAGNOSIS — M79.10 MYALGIA, UNSPECIFIED SITE: ICD-10-CM

## 2022-06-09 PROCEDURE — 20552 NJX 1/MLT TRIGGER POINT 1/2: CPT | Mod: RT

## 2022-06-09 PROCEDURE — 99204 OFFICE O/P NEW MOD 45 MIN: CPT | Mod: 25,GC

## 2022-06-09 RX ORDER — OXYCODONE HYDROCHLORIDE AND ACETAMINOPHEN 10; 325 MG/1; MG/1
TABLET ORAL
Refills: 0 | Status: COMPLETED | COMMUNITY
End: 2022-06-09

## 2022-06-09 RX ORDER — PREDNISONE 10 MG
TABLET ORAL
Refills: 0 | Status: COMPLETED | COMMUNITY
End: 2022-06-09

## 2022-06-09 RX ORDER — DICLOFENAC SODIUM 75 MG/1
75 TABLET, DELAYED RELEASE ORAL
Qty: 60 | Refills: 0 | Status: COMPLETED | COMMUNITY
Start: 2022-05-23 | End: 2022-06-09

## 2022-06-09 NOTE — DATA REVIEWED
[FreeTextEntry1] : MR L Spine 5/31/22 reviewed by me: large L4-5 central disc herniation with contact  of descending L5 nerve roots\par \par ACC: [id number] EXAM: MR SPINE LUMBAR\par \par PROCEDURE DATE: 05/31/2022\par \par \par \par INTERPRETATION: MRI of the lumbar spine without contrast\par \par CLINICAL INDICATION: Low back pain\par \par TECHNIQUE: Multiplanar, multisequence MR images of the lumbar spine were obtained without the administration of intravenous contrast.\par \par COMPARISON: CT lumbar spine 5/31/2022\par \par FINDINGS:\par \par Vertebral body height, marrow signal homogeneity, and facet alignment are maintained throughout the visualized spinal segments.\par \par Straightening of the normal cervical lordosis.\par \par Disc space narrowing at L5-S1.\par \par The conus is normal in size, position, and signal characteristics, ending at T12-L1.\par \par L3-L4: Mild broad-based disc protrusion deforms the ventral thecal sac. No neural foraminal narrowing.\par \par L4-L5: Large broad-based disc protrusion contacts the bilateral descending L5 nerve roots in the lateral recesses and results in mild ventral thecal sac compression. No neural foraminal narrowing.\par \par Remaining visualized spinal levels demonstrate no spinal canal stenosis or neural foraminal narrowing\par \par IMPRESSION:\par \par L4-L5 large broad-based disc protrusion contacts the bilateral descending L5 nerve roots in the lateral recesses and results in mild ventral thecal sac compression.\par \par \par \par --- End of Report ---

## 2022-06-09 NOTE — PROCEDURE
[de-identified] : Reason for procedure: Myalgia\par \par Procedure: Trigger Point Injections\par Physician: Dr. Urias\par Medication injected: Lidocaine 1%, 2cc total\par Sedation medications: None\par Estimated blood loss: None\par Complications: None\par \par Technique: R/B/A to trigger point injection reviewed with patient. The patient is agreeable and wishes to proceed.   The patient was placed in prone position and trigger points of the right lumbar paraspinals were identified. The area was prepped in normal sterile fashion with Chloroprep. A 27 gauge 1.25 inch needle was advanced into the palpable trigger points with reproduction of pain. After negative aspiration of heme, the above medications were injected into the trigger areas. Needle was then removed, bandaid placed over injection sites. There was no complications, the patient was provided with post injection instructions.

## 2022-06-09 NOTE — PHYSICAL EXAM
[FreeTextEntry1] : PE:\par Constitutional: In NAD, calm and cooperative\par MSK (Back)\par 	Inspection: No gross welling or erythema\par 	Palpation: Tenderness of the bilateral, R>L, lower lumbar paraspinals. Tense paraspinal muscles. \par 	ROM: Patient in 10-15 flexed position normally due to pain, limited extension due to pain\par 	Strength: 4+/5 strength in bilateral lower extremities. Limited due to pain. \par 	Reflexes: 2+ Patella reflex bilaterally, 2+ Achilles reflex bilaterally, negative clonus bilaterally\par 	Sensation: Decrease sensation on the R along the lateral leg wrapping around the 5th digit. \par Special tests:\par SLR: Positive on the R. \par SRI: Positive on the L. \par Facet loading: Positive b/l.

## 2022-06-09 NOTE — ASSESSMENT
[FreeTextEntry1] : Mr. MAVERICK DUNLAP is a 27 year old male who presents with low back pain. Patient referred by Dr. Lima of neurosurgery. Patient with a herniated disk at L4-L5 level on the recent MRI likely causing radicular symptoms down the R leg all the way to the 5th digit. He also has a component of myofascial pain on top of his radicular pain. Denies any red flag signs at this time. Will recommend:\par - MRI L Spine reviewed. ED notes reviewed. Neurosurgery notes reviewed. \par - TPI performed today\par - Discussed with patient the risks (including but not limited to bleeding, infection, nerve damage, etc), benefits and alternatives to an epidural steroid injection for which patient understands. Will plan for a right L5-S1 TFESI. He will need a COVID PCR prior. \par - Start Cyclobenzaprine 5-10mg Qhs PRN. Patient advised of possible side effects including sedation. \par - Will start trial of Gabapentin 100mg Qhs with gradual increase to 300mg Qhs. Patient aware of side effects and risks including sedation, leg swelling, and possible mood changes. \par - Will give patient PT rx to start if he is able to tolerate it \par \par Return for procedure. Patient aware of red flag signs including any changes to their bowel/bladder control, groin numbness or new weakness. Patient knows to seek immediate attention by calling 911 or going to nearest ER if these symptoms appear.

## 2022-06-09 NOTE — HISTORY OF PRESENT ILLNESS
[FreeTextEntry1] : Mr. MAVERICK DUNLAP is a 27 year old male who presents with low back pain. Patient referred by Dr. Lima of neurosurgery. \par \par Location: Low back \par Onset:Acute on chronic. Patient has history of R foot injury which has caused his gait to be off. He then developed severe low back pain with RLE symptoms, eventually having urinary retention/difficulty urinating for which he went to [facility]. Patient was cleared by neurosurgery and told to follow up outpatient. \par Provocation/Palliative: Walking & sitting makes the pain worse. Laying down makes the pain better. \par Quality: Dull & burning sensation. \par Radiation:Into RLE all the way to the toes. Some on the L side as well. \par Severity: 10/10. Laying down 0/10.\par Timing: Worse in the morning. \par \par Numbness along the lateral R leg. Denies any associated leg weakness. Denies any loss of bowel/bladder control or any groin numbness.\par Previous medications trialed: Flexiril, Robaxin, Ibuprofen, diclofenac, Medrol dose pack, Prednisone. Steroids helped the most.  \par Previous procedures relevant to complaint: None \par Conservative therapy tried?: No PT or Acupuncture\par

## 2022-06-12 ENCOUNTER — RX RENEWAL (OUTPATIENT)
Age: 27
End: 2022-06-12

## 2022-06-13 ENCOUNTER — APPOINTMENT (OUTPATIENT)
Dept: ORTHOPEDIC SURGERY | Facility: CLINIC | Age: 27
End: 2022-06-13

## 2022-06-27 ENCOUNTER — APPOINTMENT (OUTPATIENT)
Dept: PHYSICAL MEDICINE AND REHAB | Facility: GI CENTER | Age: 27
End: 2022-06-27

## 2022-08-09 NOTE — PATIENT PROFILE ADULT - DO YOU FEEL THREATENED BY OTHERS?
no Pt. with SUSIE in the setting of cardiorenal syndrome/ cardiogenic shock. Remains anuric and hypervolemic. Had HD yesterday with net removal of 2.4L of fluid. Plan for additional session of HD today. Optimize hemodynamics. Monitor BMP. Strict I/Os. Avoid nephrotoxins.     Vivian Villeda MD  Office : [phone]  Contact me on [employer] Teams.

## 2022-08-15 NOTE — PHYSICAL EXAM
[General Appearance - Alert] : alert [General Appearance - In No Acute Distress] : in no acute distress [Oriented To Time, Place, And Person] : oriented to person, place, and time [Impaired Insight] : insight and judgment were intact [Affect] : the affect was normal [Person] : oriented to person [Place] : oriented to place [Time] : oriented to time [Short Term Intact] : short term memory intact [Remote Intact] : remote memory intact [Span Intact] : the attention span was normal [Concentration Intact] : normal concentrating ability [Fluency] : fluency intact [Comprehension] : comprehension intact [Current Events] : adequate knowledge of current events [Past History] : adequate knowledge of personal past history [Vocabulary] : adequate range of vocabulary [Cranial Nerves Optic (II)] : visual acuity intact bilaterally,  pupils equal round and reactive to light [Cranial Nerves Oculomotor (III)] : extraocular motion intact [Cranial Nerves Trigeminal (V)] : facial sensation intact symmetrically [Cranial Nerves Facial (VII)] : face symmetrical [Cranial Nerves Glossopharyngeal (IX)] : tongue and palate midline [Cranial Nerves Accessory (XI - Cranial And Spinal)] : head turning and shoulder shrug symmetric [Cranial Nerves Hypoglossal (XII)] : there was no tongue deviation with protrusion [Motor Tone] : muscle tone was normal in all four extremities [Motor Strength] : muscle strength was normal in all four extremities [No Muscle Atrophy] : normal bulk in all four extremities [Sensation Tactile Decrease] : light touch was intact [Sensation Pain / Temperature Decrease] : pain and temperature was intact [Balance] : balance was intact [2+] : Patella left 2+ [No Visual Abnormalities] : no visible abnormailities [Antalgic] : antalgic [Able to heel walk] : the patient was able to heel walk [Past-pointing] : there was no past-pointing [Tremor] : no tremor present [FreeTextEntry6] : UE and LE 5/5 to confrontational testing [FreeTextEntry8] : difficulty with heel and toe-walking secondary to pain [Able to toe walk] : the patient was not able to toe walk

## 2022-08-15 NOTE — ASSESSMENT
[FreeTextEntry1] : Mr. Santos presents for follow-up with interval history as above.  The patient wishes to continue a trial of conservative therapy and understands that he would be an appropriate surgical candidate should be fail conservative therapy.  I will refer the patient to Dr. Urias and see him back in 4-6 weeks to assess his progress.  He knows to call the office with any new or concerning symptoms in the interim or should be wish to re-consider the option for surgical intervention.\par \par I, Dr. João Lima, personally performed the evaluation and management (E/M) services for this patient.  That E/M includes assessment of the initial examination, assessing the pertinent medical and surgical history, and establishing the plan of care.  At the time of the visit, my ACP, Rebekah aCraballo, actively participated in the evaluation and management services for this patient to be followed going forward in accordance with the plan documented in the clinical note.\par \par

## 2022-08-15 NOTE — DATA REVIEWED
[de-identified] : MRI from [facility] was reviewed with the official report.  See Allscripts for full details.

## 2022-08-15 NOTE — HISTORY OF PRESENT ILLNESS
[FreeTextEntry1] : Mr. Santos presents for follow-up after initial evaluation at [facility].  The patient had foot pain after an injury with a powerwasher several years ago.  He has had persistent back and RLE radiculopathy over the past year/exacerbated last 3 months.  Mr. Santos has had several ER visits, most recently at [facility] earlier this week.  During evaluation, he complained of sensation of incomplete bladder evacuation but had a normal PVR of 30 cc.  He is somewhat improved with a Medrol dose pack and wishes for trial of additional conservative therapy.

## 2022-09-26 NOTE — ED PROVIDER NOTE - CONTEXT
unknown Azelaic Acid Pregnancy And Lactation Text: This medication is considered safe during pregnancy and breast feeding.

## 2023-04-10 ENCOUNTER — APPOINTMENT (OUTPATIENT)
Dept: OTOLARYNGOLOGY | Facility: CLINIC | Age: 28
End: 2023-04-10
Payer: MEDICAID

## 2023-04-10 ENCOUNTER — NON-APPOINTMENT (OUTPATIENT)
Age: 28
End: 2023-04-10

## 2023-04-10 VITALS
DIASTOLIC BLOOD PRESSURE: 84 MMHG | SYSTOLIC BLOOD PRESSURE: 126 MMHG | BODY MASS INDEX: 23.1 KG/M2 | HEIGHT: 71 IN | HEART RATE: 69 BPM | WEIGHT: 165 LBS

## 2023-04-10 PROBLEM — Z78.9 OTHER SPECIFIED HEALTH STATUS: Chronic | Status: ACTIVE | Noted: 2022-05-25

## 2023-04-10 PROCEDURE — 92557 COMPREHENSIVE HEARING TEST: CPT

## 2023-04-10 PROCEDURE — 92567 TYMPANOMETRY: CPT

## 2023-04-10 PROCEDURE — 99204 OFFICE O/P NEW MOD 45 MIN: CPT

## 2023-04-10 RX ORDER — GABAPENTIN 100 MG/1
100 CAPSULE ORAL
Qty: 90 | Refills: 0 | Status: DISCONTINUED | COMMUNITY
Start: 2022-06-09 | End: 2023-04-10

## 2023-04-10 RX ORDER — IBUPROFEN 800 MG/1
800 TABLET, FILM COATED ORAL
Qty: 90 | Refills: 0 | Status: DISCONTINUED | COMMUNITY
Start: 2019-04-26 | End: 2023-04-10

## 2023-04-10 RX ORDER — CYCLOBENZAPRINE HYDROCHLORIDE 5 MG/1
5 TABLET, FILM COATED ORAL
Qty: 60 | Refills: 0 | Status: DISCONTINUED | COMMUNITY
Start: 2022-06-09 | End: 2023-04-10

## 2023-04-10 RX ORDER — IBUPROFEN 200 MG/1
TABLET, COATED ORAL
Refills: 0 | Status: DISCONTINUED | COMMUNITY
End: 2023-04-10

## 2023-04-10 NOTE — HISTORY OF PRESENT ILLNESS
[de-identified] : 28 yr old male c/o tinnitus AS\par \par +noise exp 2017 at shooting range without ear protection. +hearing loss since then.  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

## 2023-04-10 NOTE — DATA REVIEWED
[de-identified] : type A tymps AU\par ETF WNL AD; ETD AS\par AD:  hearing -8000 Hz\par AS: hearing -4000 Hz to a mod severe HF HL 6-8kHz

## 2023-04-10 NOTE — REVIEW OF SYSTEMS
[Seasonal Allergies] : seasonal allergies [Ear Pain] : ear pain [Ear Itch] : ear itch [Dizziness] : dizziness [Vertigo] : vertigo [Ear Noises] : ear noises [Nasal Congestion] : nasal congestion [Negative] : Heme/Lymph

## 2023-04-10 NOTE — ASSESSMENT
[FreeTextEntry1] :   AD WNL w type A, AS -4000Hz to mod-severe SNHL w type A\par \par HAE\par annual audio

## 2023-05-18 ENCOUNTER — APPOINTMENT (OUTPATIENT)
Dept: OTOLARYNGOLOGY | Facility: CLINIC | Age: 28
End: 2023-05-18
Payer: MEDICAID

## 2023-05-18 VITALS
SYSTOLIC BLOOD PRESSURE: 143 MMHG | WEIGHT: 170 LBS | DIASTOLIC BLOOD PRESSURE: 92 MMHG | HEIGHT: 70 IN | HEART RATE: 79 BPM | BODY MASS INDEX: 24.34 KG/M2

## 2023-05-18 VITALS — DIASTOLIC BLOOD PRESSURE: 83 MMHG | SYSTOLIC BLOOD PRESSURE: 140 MMHG

## 2023-05-18 DIAGNOSIS — H93.12 TINNITUS, LEFT EAR: ICD-10-CM

## 2023-05-18 DIAGNOSIS — J34.2 DEVIATED NASAL SEPTUM: ICD-10-CM

## 2023-05-18 DIAGNOSIS — H90.42 SENSORINEURAL HEARING LOSS, UNILATERAL, LEFT EAR, WITH UNRESTRICTED HEARING ON THE CONTRALATERAL SIDE: ICD-10-CM

## 2023-05-18 DIAGNOSIS — H69.82 OTHER SPECIFIED DISORDERS OF EUSTACHIAN TUBE, LEFT EAR: ICD-10-CM

## 2023-05-18 PROCEDURE — 92557 COMPREHENSIVE HEARING TEST: CPT

## 2023-05-18 PROCEDURE — 92567 TYMPANOMETRY: CPT

## 2023-05-18 PROCEDURE — 99213 OFFICE O/P EST LOW 20 MIN: CPT

## 2023-05-18 RX ORDER — FLUTICASONE PROPIONATE 50 UG/1
50 SPRAY, METERED NASAL
Qty: 3 | Refills: 3 | Status: ACTIVE | COMMUNITY
Start: 2023-05-18 | End: 1900-01-01

## 2023-05-18 NOTE — ASSESSMENT
[FreeTextEntry1] : ETD and tinnitus improved\par \par  AD WNL w type A, AS WNL sloping to mod-severe HF SNHL w type A\par annual audio\par \par rx flonase

## 2023-05-18 NOTE — DATA REVIEWED
[de-identified] : - TYPE  A TYMPS AU (ETF ABNORMAL AU)\par RIGHT: HWNL 250-8000HZ\par LEFT: HWNL SLOPING TO A MOD-SEVERE HF SNHL\par *LEFT ASYMM 4-8KHZ

## 2023-12-01 NOTE — ED ADULT NURSE NOTE - CADM POA CENTRAL LINE
Primary Care Physician: Kai Talavera MD    Date of Visit: 12/01/2023     Chief Complaint:   Chief Complaint   Patient presents with    Sick Visit     Cough post nasal surgery        Subjective:   Tracy Hdz is a 69 y.o. female presents cough     HPI:  HPI    Cough and wheezing that started 2-3 days ago.   Can not tell if cough is productive b/c she still has post-op drainage.  3 weeks ago had sinus surgery and repair of deviated septum  No fever or chills.  +post-op drainage has been dark brown.   No ill contacts.    Past Medical History:  Past Medical History:   Diagnosis Date    Allergy status to unspecified drugs, medicaments and biological substances     History of seasonal allergies    Disorder of thyroid, unspecified     Thyroid trouble    Other conditions influencing health status     Herniated disc    Personal history of other diseases of the circulatory system     History of hypertension    Personal history of other diseases of the digestive system     History of esophageal reflux    Personal history of other diseases of the musculoskeletal system and connective tissue     History of osteoarthritis    Personal history of other diseases of the musculoskeletal system and connective tissue     History of spinal stenosis    Personal history of other specified conditions     History of urinary incontinence    Pure hypercholesterolemia, unspecified     High cholesterol        Social History:   reports that she has never smoked. She has never used smokeless tobacco. She reports that she does not currently use alcohol. She reports that she does not use drugs.     Family History:  family history includes Alzheimer's disease in her mother; Diabetes in her father; hearing problem in her mother; heart problem in her father; seasonal  allergies in her mother.      Allergies:  Allergies   Allergen Reactions    Morphine Unknown and Other    Penicillins Swelling    Cortisone Palpitations       Outpatient  Medications:  Current Outpatient Medications   Medication Instructions    albuterol (ProAir HFA) 90 mcg/actuation inhaler 2 puffs, inhalation, Every 4 hours PRN    atorvastatin (LIPITOR) 80 mg, oral, Daily    benzonatate (TESSALON) 200 mg, oral, 3 times daily PRN, Do not crush or chew.    calcium carbonate/vitamin D3 (CALCIUM 600 + D,3, ORAL) 1 tablet, oral, Daily    chlorzoxazone (Lorzone) 750 mg tablet 1 tablet, oral, 2 times daily    cholecalciferol (VITAMIN D-3) 2,000 Units, oral, Daily    EPINEPHrine (EpiPen 2-Ranjit) 0.3 mg/0.3 mL injection syringe 1 Syringe, intramuscular, Once as needed    fluticasone (Flonase) 50 mcg/actuation nasal spray 2 sprays, Each Nostril, Daily    gabapentin (NEURONTIN) 800 mg, oral, 2 times daily    glucosamine-chondroit-vit C-Mn 500-400 mg capsule oral    hydrOXYzine pamoate (VISTARIL) 25 mg, oral, 2 times daily PRN    lactobacillus (Culturelle) 10 billion cell capsule 1 capsule, oral, Daily    lamoTRIgine (LaMICtal) 100 mg tablet 1 tablet, oral, Nightly    lancets (OneTouch Delica Plus Lancet) 33 gauge misc 1 strip, soft tissue injection, Daily    levothyroxine (SYNTHROID, LEVOXYL) 125 mcg, oral, See admin instructions, TAKE 1 TABLET BY MOUTH MONDAY THROUGH SATURDAY AND 1/2 TABLET BY MOUTH ON SUNDAY<BR>    metFORMIN XR (GLUCOPHAGE-XR) 500 mg, oral, 2 times daily, TAKE BEFORE MEALS. DO NOT CRUSH, CHEW, OR SPLIT<BR>    montelukast (SINGULAIR) 10 mg, oral, Daily    nadolol (CORGARD) 20 mg, oral, Daily    omeprazole (PRILOSEC) 40 mg, oral    OneTouch Verio test strips strip 1 strip, in vitro, Daily    sucralfate (CARAFATE) 1 g, oral, 4 times daily before meals and nightly    tiZANidine (ZANAFLEX) 2 mg, oral, 2 times daily PRN    tolterodine LA (DETROL LA) 4 mg, oral, Daily    vit A,C and E-lutein-minerals (Ocuvite with Lutein) 300 mcg-200 mg-27 mg-2 mg tablet 1 tablet, oral         ROS:   Review of Systems   Constitutional:  Negative for fever.   HENT:  Positive for congestion and sinus  "pressure (mild).    Respiratory:  Positive for cough and wheezing. Negative for shortness of breath.    Cardiovascular: Negative.    Gastrointestinal: Negative.    Endocrine: Negative.         Vitals:  Vitals:    12/01/23 1559   BP: 116/80   Pulse: 80   Temp: 36.7 °C (98.1 °F)   Weight: 83.5 kg (184 lb)   Height: 1.651 m (5' 5\")     Wt Readings from Last 1 Encounters:   12/01/23 83.5 kg (184 lb)     Body mass index is 30.62 kg/m².       Physical Exam:  Physical Exam  Constitutional:       Appearance: Normal appearance.   HENT:      Head: Normocephalic and atraumatic.      Right Ear: Tympanic membrane and ear canal normal.      Left Ear: Tympanic membrane and ear canal normal.      Nose: Nose normal.      Mouth/Throat:      Mouth: Mucous membranes are moist.      Pharynx: Oropharynx is clear.   Eyes:      Conjunctiva/sclera: Conjunctivae normal.      Pupils: Pupils are equal, round, and reactive to light.   Cardiovascular:      Rate and Rhythm: Normal rate and regular rhythm.      Heart sounds: Normal heart sounds.   Pulmonary:      Effort: Pulmonary effort is normal.      Comments: Bs are decreased.  There is no wheezing,  Neurological:      Mental Status: She is alert and oriented to person, place, and time.               Assessment/Plan   Diagnoses and all orders for this visit:  Cough, unspecified type  -     albuterol (ProAir HFA) 90 mcg/actuation inhaler; Inhale 2 puffs every 4 hours if needed for wheezing or shortness of breath.  -     benzonatate (Tessalon) 200 mg capsule; Take 1 capsule (200 mg) by mouth 3 times a day as needed for cough for up to 15 days. Do not crush or chew.  Mild intermittent asthma with acute exacerbation  Trelegy ellipta 100/62.5 1 inhal daily    Orders:  No orders of the defined types were placed in this encounter.       Followup Appts:  No future appointments.        ____________________________________________________________  Kai Talavera MD  " No

## 2023-12-08 ENCOUNTER — APPOINTMENT (OUTPATIENT)
Dept: NEUROSURGERY | Facility: CLINIC | Age: 28
End: 2023-12-08
Payer: MEDICAID

## 2023-12-08 VITALS
DIASTOLIC BLOOD PRESSURE: 79 MMHG | HEIGHT: 71 IN | BODY MASS INDEX: 24.5 KG/M2 | SYSTOLIC BLOOD PRESSURE: 128 MMHG | TEMPERATURE: 98.5 F | OXYGEN SATURATION: 98 % | HEART RATE: 69 BPM | WEIGHT: 175 LBS

## 2023-12-08 PROCEDURE — 99214 OFFICE O/P EST MOD 30 MIN: CPT

## 2024-01-01 NOTE — ED ADULT NURSE NOTE - PRO INTERPRETER NEED 2
Immunization Documentation  Verified patient's first and last name, and . Accompanied to clinic visit with Mother and Father. Stated reason for visit today is to receive RSV antibody. Denied any concerns with previous immunizations. Allergies reviewed. IIS handout reviewed and given to take home. Immunization prepared and administered per standing order. Administration documented in IMMUNIZATIONS (see flowsheet and order for further information). Instructed to wait in lobby for 15 minutes post-injection and notify staff immediately of any reaction.     DEXTER PONCE RN on 2024 at 1:56 PM      
English

## 2024-01-08 ENCOUNTER — APPOINTMENT (OUTPATIENT)
Dept: NEUROSURGERY | Facility: CLINIC | Age: 29
End: 2024-01-08
Payer: MEDICAID

## 2024-01-08 VITALS
DIASTOLIC BLOOD PRESSURE: 75 MMHG | BODY MASS INDEX: 24.5 KG/M2 | HEART RATE: 62 BPM | TEMPERATURE: 98.3 F | OXYGEN SATURATION: 99 % | HEIGHT: 71 IN | WEIGHT: 175 LBS | SYSTOLIC BLOOD PRESSURE: 108 MMHG

## 2024-01-08 DIAGNOSIS — M54.16 RADICULOPATHY, LUMBAR REGION: ICD-10-CM

## 2024-01-08 DIAGNOSIS — R33.9 RETENTION OF URINE, UNSPECIFIED: ICD-10-CM

## 2024-01-08 PROCEDURE — 99214 OFFICE O/P EST MOD 30 MIN: CPT

## 2024-01-08 NOTE — HISTORY OF PRESENT ILLNESS
[FreeTextEntry1] : Mr. Santos presents for follow-up after initial evaluation at [facility].  The patient had foot pain after an injury with a powerwasher several years ago.  He has had persistent back and RLE radiculopathy over the past year/exacerbated last 3 months.  Mr. Santos has had several ER visits, most recently at [facility] earlier this week.  During evaluation, he complained of sensation of incomplete bladder evacuation but had a normal PVR of 30 cc.  He is somewhat improved with a Medrol dose pack and wishes for trial of additional conservative therapy.   Patient last seen in 2022.  He mentions that overall he was doing well for a while but then his symptoms in terms of her right lower extremity pain accompanied with back pain resumed.  He has been under the care of pain management and has completed 2 epidurals and in addition he has completed a 6 series of physical therapy at this point his pain is persistent in nature and wishes to return return to consider if there is any role for surgical intervention.

## 2024-01-08 NOTE — ASSESSMENT
[FreeTextEntry1] : Mr. Santos presents with above history.  He is neurologically intact.  I have personally reviewed his MRI lumbar spine that reveals right   Plan: Patient would be an appropriate surgical candidate for right L4-L5 diskectomy after completion of urological workup for his post void dysfunction complaints.  CT lumbar spine to assess bony structures and surgical planning.   benefit: hopeful decompression, hopeful improvement in symptoms, hopeful prevention of progression of deficit  alternative: no surgical intervention; continued conservative therapy (PT, pain management) risks: bleeding, infection, CSF leak, failure of procedure, de-stabilization of the spine, re-herniation of disk fragment, need for re-operation, worsening pain, seizure, stroke, coma, death, DVT, PE, MI, PNA, UTI, difficulty/failure to intubate or extubate, new or worsening numbness, tingling, weakness, paralysis, sensory changes, difficulty/inability to ambulate, sexual dysfunction, incontinence Patient has been given an opportunity to ask and have their questions answered to their satisfaction. Patient knows to call the office if there are any new or worsening symptoms.

## 2024-01-08 NOTE — DATA REVIEWED
[de-identified] : MRI from [facility] was reviewed with the official report.  See Allscripts for full details. [de-identified] : DATE OF EXAM: 12/29/2023 R. Phys. Name: Rebekah Caraballo R. Phys. Address: [address] [place] [address], [facility] R. Phys. Phone: [phone] MRI-LUMBAR SPINE NON CONTRAST  HISTORY: M54.41 Lower back pain with right sciatica M54.42 Lower back pain with left sciatica M48.061 Spinal stenosis, lumbar region without neurogenic subha M62.81 Muscle weakness M62.830 Spasm of back muscles R26.2 Difficulty Walking  TECHNIQUE: Sagittal T1, STIR and T2 weighted images were supplemented by axial proton density images through the disc spaces. Study was performed on an 1.5 Phyllis high-field wide-bore magnet.   FINDINGS:  Vertebral body heights: Maintained.  Alignment: Normal.  Marrow signal: There is no acute compression fracture. There is mild Modic type I endplate changes at L4-L5 level.  Spinal canal: The conus medullaris is normal.  Paravertebral soft tissues: Unremarkable.  Discs: There is disc dehydration and mild disc height loss at L4-L5 level.  L1-2: There is no disc bulge, herniation, thecal sac compression or foraminal narrowing.  L2-3: There is no disc bulge, herniation, thecal sac compression or foraminal narrowing.  L3-4: There is mild disc bulge. There is no significant spinal canal or foraminal stenosis.  L4-5: There is broad-based disc herniation impinging the descending both L5 nerve roots within lateral recesses. There is right foraminal osseous ridging. There is moderate right foraminal narrowing and impingement of exiting right L4 nerve root. There is mild spinal canal stenosis and mild left foraminal narrowing.  L5-S1: There is mild disc bulge without significant spinal canal or foraminal stenosis.  IMPRESSION:   L4-5: There is broad-based disc herniation impinging the descending both L5 nerve roots within lateral recesses. There is right foraminal osseous ridging. There is moderate right foraminal narrowing and impingement of exiting right L4 nerve root. There is mild spinal canal stenosis and mild left foraminal narrowing.  ICD 10 Codes: M51.26 M51.37 M54.51  Signed by: Rebel Bethea MD Signed Date: 1/2/2024 4:50 PM EST    SIGNED BY: Rebel Bethea M.D., Ext. [id number] 01/02/2024 04:50 PM  IMPRESSION:   L4-5: There is broad-based disc herniation impinging the descending both L5 nerve roots within lateral recesses. There is right foraminal osseous ridging. There is moderate right foraminal narrowing and impingement of exiting right L4 nerve root. There is mild spinal canal stenosis and mild left foraminal narrowing.

## 2024-06-07 NOTE — ED ADULT NURSE NOTE - PAIN: PRESENCE, MLM
Fwd to surgery schedulers. Patient needs to be scheduled for left triceps tendon repair with Dr. Rodriguez.     Data letter entered in Epic.    Date(s) preferred: within next 2-3 weeks.      Patient has had a heart attack in the past 3 months? NO  Patient has had cardiac stent placed in the past year? NO  Wheelchair bound: NO  On 9.22.21, patients medications verified with [facility].   Instructions given to patient.   NSAIDS stopped on: 10.15.21  Patient is on Plavix?  NO  Patient is on Aspirin? NO  Patient is on Coumadin/Warfarin? NO  Patient is a Coumadin Clinic patient? NO  Does patient have any piercing's: NO  Patient has implanted devices such as Pacemaker/AICD? NO  Does patient have any other metal in the body?:staples in groin hernia 20 years AGO    Medical Conditions: Does patient now have or have a history of the following conditions:  If yes, give current status of condition:  Sleep Apnea: YES - CPAP at patient states has not used in many years but has sleep apnea .  Do you sleep sitting up? NO  Can you walk 20 feet without getting short of breath? YES  Kidney Problems: NO   If General or Choice anesthesia, Any history or familial history of malignant hyperthermia?  NO    Limited ADL's:   Currently resides in a Nursing Home: NO  Dementia: NO    Language Spoken: English  Total Hearing Loss? no     Patients medications verified with : No  Instructions given: YES: 9.30.21  ASC Pre-op Instruction Sheet has been given to patient: YESMy Chart   Patient scheduled for surgery on 10.22.21 at [facility] with Dr Rodriguez   Yes 07-Jun-2024 01:49 complains of pain/discomfort

## 2024-12-25 PROBLEM — F10.90 ALCOHOL USE: Status: ACTIVE | Noted: 2019-04-26

## 2025-03-04 ENCOUNTER — APPOINTMENT (OUTPATIENT)
Dept: NEUROSURGERY | Facility: CLINIC | Age: 30
End: 2025-03-04
Payer: MEDICAID

## 2025-03-04 ENCOUNTER — NON-APPOINTMENT (OUTPATIENT)
Age: 30
End: 2025-03-04

## 2025-03-04 VITALS
WEIGHT: 175 LBS | OXYGEN SATURATION: 98 % | SYSTOLIC BLOOD PRESSURE: 130 MMHG | DIASTOLIC BLOOD PRESSURE: 84 MMHG | TEMPERATURE: 97.5 F | BODY MASS INDEX: 24.5 KG/M2 | HEIGHT: 71 IN | HEART RATE: 79 BPM

## 2025-03-04 PROCEDURE — 99213 OFFICE O/P EST LOW 20 MIN: CPT

## 2025-04-01 ENCOUNTER — APPOINTMENT (OUTPATIENT)
Dept: NEUROSURGERY | Facility: CLINIC | Age: 30
End: 2025-04-01
Payer: MEDICAID

## 2025-04-01 VITALS
TEMPERATURE: 98 F | HEIGHT: 71 IN | SYSTOLIC BLOOD PRESSURE: 122 MMHG | WEIGHT: 175 LBS | DIASTOLIC BLOOD PRESSURE: 81 MMHG | OXYGEN SATURATION: 97 % | HEART RATE: 79 BPM | BODY MASS INDEX: 24.5 KG/M2

## 2025-04-01 PROCEDURE — 99213 OFFICE O/P EST LOW 20 MIN: CPT

## 2025-04-07 ENCOUNTER — APPOINTMENT (OUTPATIENT)
Dept: PHYSICAL MEDICINE AND REHAB | Facility: CLINIC | Age: 30
End: 2025-04-07
Payer: MEDICAID

## 2025-04-07 VITALS
SYSTOLIC BLOOD PRESSURE: 134 MMHG | WEIGHT: 175 LBS | DIASTOLIC BLOOD PRESSURE: 86 MMHG | HEART RATE: 72 BPM | HEIGHT: 71 IN | BODY MASS INDEX: 24.5 KG/M2

## 2025-04-07 DIAGNOSIS — M54.16 RADICULOPATHY, LUMBAR REGION: ICD-10-CM

## 2025-04-07 PROCEDURE — 95886 MUSC TEST DONE W/N TEST COMP: CPT | Mod: RT

## 2025-04-07 PROCEDURE — 95908 NRV CNDJ TST 3-4 STUDIES: CPT

## 2025-04-24 ENCOUNTER — APPOINTMENT (OUTPATIENT)
Dept: NEUROSURGERY | Facility: CLINIC | Age: 30
End: 2025-04-24
Payer: MEDICAID

## 2025-04-24 VITALS
WEIGHT: 175 LBS | HEIGHT: 71 IN | DIASTOLIC BLOOD PRESSURE: 84 MMHG | RESPIRATION RATE: 14 BRPM | HEART RATE: 74 BPM | SYSTOLIC BLOOD PRESSURE: 124 MMHG | BODY MASS INDEX: 24.5 KG/M2

## 2025-04-24 PROCEDURE — 99213 OFFICE O/P EST LOW 20 MIN: CPT

## 2025-05-29 NOTE — ED ADULT NURSE NOTE - NEURO MENTATION
COPD/PN Week 3 Survey      Flowsheet Row Responses   [facility] patient discharged from? [facility]   Does the patient have one of the following disease processes/diagnoses(primary or secondary)? COPD   Week 3 attempt successful? Yes   Call start time 1806   Call end time 1814   Discharge diagnosis Dyspnea   Person spoke with today (if not patient) and relationship Wife   Meds reviewed with patient/caregiver? Yes   Is the patient having any side effects they believe may be caused by any medication additions or changes? No   Does the patient have all medications ordered at discharge? Yes   Is the patient taking all medications as directed (includes completed medication regime)? Yes   Does the patient have a primary care provider?  Yes   Does the patient have an appointment with their PCP or specialist within 7 days of discharge? Yes   Has the patient kept scheduled appointments due by today? Yes   What is the Home health agency?  [facility]   Has home health visited the patient within 72 hours of discharge? Yes   Psychosocial issues? No  [Not having hallucinations any more]   What is the patient's perception of their health status since discharge? Improving   Nursing Interventions Nurse provided patient education   If the patient is a current smoker, are they able to teach back resources for cessation? Not a smoker   Is the patient/caregiver able to teach back the hierarchy of who to call/visit for symptoms/problems? PCP, Specialist, Home health nurse, Urgent Care, ED, 911 Yes   Week 3 call completed? Yes   Graduated Yes   Is the patient interested in additional calls from an ambulatory ? No   Would this patient benefit from a Referral to [facility] Social Work? No   Wrap up additional comments Spouse denies pt is having hallucinations at this time. No medication issues. Spouse reports that pt does have leg cramps and drinking gatorade that takes a while to work. Spouse advised to have pt drink some dill  pickle juice as pt does have dips in blood sodium levels as well. Pt had PCP fu appt.   Call end time 1814            Kimi OLIVARES - Registered Nurse   normal